# Patient Record
Sex: FEMALE | Race: WHITE | NOT HISPANIC OR LATINO | Employment: OTHER | URBAN - METROPOLITAN AREA
[De-identification: names, ages, dates, MRNs, and addresses within clinical notes are randomized per-mention and may not be internally consistent; named-entity substitution may affect disease eponyms.]

---

## 2023-05-04 LAB
ALANINE AMINOTRANSFERASE (SGPT) (U/L) IN SER/PLAS: 33 U/L (ref 7–45)
ALBUMIN (G/DL) IN SER/PLAS: 4.3 G/DL (ref 3.4–5)
ALBUMIN (MG/L) IN URINE: 7.3 MG/L
ALBUMIN/CREATININE (UG/MG) IN URINE: 6.5 UG/MG CRT (ref 0–30)
ALKALINE PHOSPHATASE (U/L) IN SER/PLAS: 46 U/L (ref 33–136)
ANION GAP IN SER/PLAS: 14 MMOL/L (ref 10–20)
ASPARTATE AMINOTRANSFERASE (SGOT) (U/L) IN SER/PLAS: 13 U/L (ref 9–39)
BILIRUBIN TOTAL (MG/DL) IN SER/PLAS: 0.4 MG/DL (ref 0–1.2)
CALCIDIOL (25 OH VITAMIN D3) (NG/ML) IN SER/PLAS: 38 NG/ML
CALCIUM (MG/DL) IN SER/PLAS: 9.4 MG/DL (ref 8.6–10.3)
CARBON DIOXIDE, TOTAL (MMOL/L) IN SER/PLAS: 29 MMOL/L (ref 21–32)
CHLORIDE (MMOL/L) IN SER/PLAS: 101 MMOL/L (ref 98–107)
CHOLESTEROL (MG/DL) IN SER/PLAS: 143 MG/DL (ref 0–199)
CHOLESTEROL IN HDL (MG/DL) IN SER/PLAS: 47.8 MG/DL
CHOLESTEROL IN LDL (MG/DL) IN SER/PLAS BY DIRECT ASSAY: 74 MG/DL (ref 0–129)
CHOLESTEROL/HDL RATIO: 3
CREATININE (MG/DL) IN SER/PLAS: 0.73 MG/DL (ref 0.5–1.05)
CREATININE (MG/DL) IN URINE: 112 MG/DL (ref 20–320)
ESTIMATED AVERAGE GLUCOSE FOR HBA1C: 146 MG/DL
GFR FEMALE: >90 ML/MIN/1.73M2
GLUCOSE (MG/DL) IN SER/PLAS: 112 MG/DL (ref 74–99)
HEMOGLOBIN A1C/HEMOGLOBIN TOTAL IN BLOOD: 6.7 %
LDL: 61 MG/DL (ref 0–99)
POTASSIUM (MMOL/L) IN SER/PLAS: 4.1 MMOL/L (ref 3.5–5.3)
PROTEIN TOTAL: 7.3 G/DL (ref 6.4–8.2)
SODIUM (MMOL/L) IN SER/PLAS: 140 MMOL/L (ref 136–145)
THYROTROPIN (MIU/L) IN SER/PLAS BY DETECTION LIMIT <= 0.05 MIU/L: 0.89 MIU/L (ref 0.44–3.98)
THYROXINE (T4) FREE (NG/DL) IN SER/PLAS: 1.2 NG/DL (ref 0.61–1.12)
TRIGLYCERIDE (MG/DL) IN SER/PLAS: 171 MG/DL (ref 0–149)
UREA NITROGEN (MG/DL) IN SER/PLAS: 13 MG/DL (ref 6–23)
VLDL: 34 MG/DL (ref 0–40)

## 2023-09-13 LAB
ALANINE AMINOTRANSFERASE (SGPT) (U/L) IN SER/PLAS: 36 U/L (ref 7–45)
ALBUMIN (G/DL) IN SER/PLAS: 4.3 G/DL (ref 3.4–5)
ALKALINE PHOSPHATASE (U/L) IN SER/PLAS: 55 U/L (ref 33–136)
ANION GAP IN SER/PLAS: 13 MMOL/L (ref 10–20)
ASPARTATE AMINOTRANSFERASE (SGOT) (U/L) IN SER/PLAS: 20 U/L (ref 9–39)
BILIRUBIN TOTAL (MG/DL) IN SER/PLAS: 0.4 MG/DL (ref 0–1.2)
CALCIDIOL (25 OH VITAMIN D3) (NG/ML) IN SER/PLAS: 46 NG/ML
CALCIUM (MG/DL) IN SER/PLAS: 9.4 MG/DL (ref 8.6–10.3)
CARBON DIOXIDE, TOTAL (MMOL/L) IN SER/PLAS: 27 MMOL/L (ref 21–32)
CHLORIDE (MMOL/L) IN SER/PLAS: 105 MMOL/L (ref 98–107)
CREATININE (MG/DL) IN SER/PLAS: 0.68 MG/DL (ref 0.5–1.05)
ESTIMATED AVERAGE GLUCOSE FOR HBA1C: 128 MG/DL
GFR FEMALE: >90 ML/MIN/1.73M2
GLUCOSE (MG/DL) IN SER/PLAS: 112 MG/DL (ref 74–99)
HEMOGLOBIN A1C/HEMOGLOBIN TOTAL IN BLOOD: 6.1 %
POTASSIUM (MMOL/L) IN SER/PLAS: 4.2 MMOL/L (ref 3.5–5.3)
PROTEIN TOTAL: 7.3 G/DL (ref 6.4–8.2)
SODIUM (MMOL/L) IN SER/PLAS: 141 MMOL/L (ref 136–145)
THYROTROPIN (MIU/L) IN SER/PLAS BY DETECTION LIMIT <= 0.05 MIU/L: 1.01 MIU/L (ref 0.44–3.98)
THYROXINE (T4) FREE (NG/DL) IN SER/PLAS: 1.06 NG/DL (ref 0.61–1.12)
UREA NITROGEN (MG/DL) IN SER/PLAS: 10 MG/DL (ref 6–23)

## 2023-10-27 ENCOUNTER — HOSPITAL ENCOUNTER (OUTPATIENT)
Dept: RADIOLOGY | Facility: HOSPITAL | Age: 63
Discharge: HOME | End: 2023-10-27
Payer: COMMERCIAL

## 2023-10-27 DIAGNOSIS — F17.200 NICOTINE DEPENDENCE, UNSPECIFIED, UNCOMPLICATED: ICD-10-CM

## 2023-10-27 PROCEDURE — 71271 CT THORAX LUNG CANCER SCR C-: CPT | Performed by: RADIOLOGY

## 2023-10-27 PROCEDURE — 71271 CT THORAX LUNG CANCER SCR C-: CPT

## 2023-12-15 PROBLEM — C50.212 MALIGNANT NEOPLASM OF UPPER-INNER QUADRANT OF LEFT BREAST IN FEMALE, ESTROGEN RECEPTOR POSITIVE (MULTI): Status: ACTIVE | Noted: 2023-12-15

## 2023-12-15 PROBLEM — Z17.0 MALIGNANT NEOPLASM OF UPPER-INNER QUADRANT OF LEFT BREAST IN FEMALE, ESTROGEN RECEPTOR POSITIVE (MULTI): Status: ACTIVE | Noted: 2023-12-15

## 2023-12-19 DIAGNOSIS — G89.29 OTHER CHRONIC PAIN: Primary | ICD-10-CM

## 2023-12-19 RX ORDER — CELECOXIB 200 MG/1
200 CAPSULE ORAL 2 TIMES DAILY
Qty: 60 CAPSULE | Refills: 11 | Status: SHIPPED | OUTPATIENT
Start: 2023-12-19 | End: 2024-03-15 | Stop reason: SDUPTHER

## 2024-01-15 ENCOUNTER — APPOINTMENT (OUTPATIENT)
Dept: RADIOLOGY | Facility: CLINIC | Age: 64
End: 2024-01-15
Payer: COMMERCIAL

## 2024-01-15 ENCOUNTER — APPOINTMENT (OUTPATIENT)
Dept: SURGICAL ONCOLOGY | Facility: CLINIC | Age: 64
End: 2024-01-15
Payer: COMMERCIAL

## 2024-01-17 ENCOUNTER — ANCILLARY PROCEDURE (OUTPATIENT)
Dept: RADIOLOGY | Facility: CLINIC | Age: 64
End: 2024-01-17
Payer: COMMERCIAL

## 2024-01-17 ENCOUNTER — OFFICE VISIT (OUTPATIENT)
Dept: SURGICAL ONCOLOGY | Facility: CLINIC | Age: 64
End: 2024-01-17
Payer: COMMERCIAL

## 2024-01-17 ENCOUNTER — ALLIED HEALTH (OUTPATIENT)
Dept: INTEGRATIVE MEDICINE | Facility: CLINIC | Age: 64
End: 2024-01-17
Payer: COMMERCIAL

## 2024-01-17 VITALS
WEIGHT: 237 LBS | HEART RATE: 97 BPM | BODY MASS INDEX: 40.68 KG/M2 | SYSTOLIC BLOOD PRESSURE: 135 MMHG | DIASTOLIC BLOOD PRESSURE: 89 MMHG

## 2024-01-17 DIAGNOSIS — R92.8 ABNORMAL MAMMOGRAM OF LEFT BREAST: Primary | ICD-10-CM

## 2024-01-17 DIAGNOSIS — R92.8 OTHER ABNORMAL AND INCONCLUSIVE FINDINGS ON DIAGNOSTIC IMAGING OF BREAST: ICD-10-CM

## 2024-01-17 DIAGNOSIS — C50.212 MALIGNANT NEOPLASM OF UPPER-INNER QUADRANT OF LEFT BREAST IN FEMALE, ESTROGEN RECEPTOR POSITIVE (MULTI): Primary | ICD-10-CM

## 2024-01-17 DIAGNOSIS — Z17.0 MALIGNANT NEOPLASM OF UPPER-INNER QUADRANT OF LEFT BREAST IN FEMALE, ESTROGEN RECEPTOR POSITIVE (MULTI): Primary | ICD-10-CM

## 2024-01-17 DIAGNOSIS — C50.212 MALIGNANT NEOPLASM OF UPPER-INNER QUADRANT OF LEFT BREAST IN FEMALE, ESTROGEN RECEPTOR POSITIVE (MULTI): ICD-10-CM

## 2024-01-17 DIAGNOSIS — Z17.0 MALIGNANT NEOPLASM OF UPPER-INNER QUADRANT OF LEFT BREAST IN FEMALE, ESTROGEN RECEPTOR POSITIVE (MULTI): ICD-10-CM

## 2024-01-17 DIAGNOSIS — R92.8 ABNORMAL FINDING ON BREAST IMAGING: ICD-10-CM

## 2024-01-17 DIAGNOSIS — R92.8 ABNORMAL MAMMOGRAM OF LEFT BREAST: ICD-10-CM

## 2024-01-17 PROBLEM — I89.0 LYMPHEDEMA OF UPPER EXTREMITY: Status: ACTIVE | Noted: 2024-01-17

## 2024-01-17 PROBLEM — F17.200 NICOTINE DEPENDENCE: Status: ACTIVE | Noted: 2024-01-17

## 2024-01-17 PROBLEM — K64.4 ANAL SKIN TAG: Status: ACTIVE | Noted: 2024-01-17

## 2024-01-17 PROBLEM — M25.531 RIGHT WRIST PAIN: Status: ACTIVE | Noted: 2024-01-17

## 2024-01-17 PROBLEM — Z85.3 PERSONAL HISTORY OF MALIGNANT NEOPLASM OF BREAST: Status: ACTIVE | Noted: 2024-01-17

## 2024-01-17 PROBLEM — M65.4 DE QUERVAIN'S TENOSYNOVITIS: Status: ACTIVE | Noted: 2024-01-17

## 2024-01-17 PROBLEM — E78.9 LIPID DISORDER: Status: ACTIVE | Noted: 2024-01-17

## 2024-01-17 PROBLEM — E66.9 OBESITY (BMI 30-39.9): Status: ACTIVE | Noted: 2024-01-17

## 2024-01-17 PROBLEM — I10 HYPERTENSION: Status: ACTIVE | Noted: 2024-01-17

## 2024-01-17 PROBLEM — F41.9 ANXIETY: Status: ACTIVE | Noted: 2024-01-17

## 2024-01-17 PROBLEM — K64.9 HEMORRHOID: Status: ACTIVE | Noted: 2024-01-17

## 2024-01-17 PROBLEM — I89.0 LYMPHEDEMA OF BREAST: Status: ACTIVE | Noted: 2024-01-17

## 2024-01-17 PROBLEM — F17.210 SMOKING 1/2 PACK A DAY OR LESS: Status: ACTIVE | Noted: 2024-01-17

## 2024-01-17 PROBLEM — N92.0 MENORRHAGIA: Status: ACTIVE | Noted: 2024-01-17

## 2024-01-17 PROBLEM — G56.00 CARPAL TUNNEL SYNDROME: Status: ACTIVE | Noted: 2024-01-17

## 2024-01-17 PROBLEM — E55.9 VITAMIN D DEFICIENCY: Status: ACTIVE | Noted: 2024-01-17

## 2024-01-17 PROBLEM — J44.9 CHRONIC OBSTRUCTIVE PULMONARY DISEASE (MULTI): Status: ACTIVE | Noted: 2024-01-17

## 2024-01-17 PROBLEM — E03.9 HYPOTHYROIDISM: Status: ACTIVE | Noted: 2024-01-17

## 2024-01-17 PROBLEM — H60.60 CHRONIC OTITIS EXTERNA: Status: ACTIVE | Noted: 2024-01-17

## 2024-01-17 PROBLEM — C50.919 BREAST CA (MULTI): Status: ACTIVE | Noted: 2024-01-17

## 2024-01-17 PROBLEM — R10.12 LEFT UPPER QUADRANT PAIN: Status: ACTIVE | Noted: 2024-01-17

## 2024-01-17 PROBLEM — E78.5 HLD (HYPERLIPIDEMIA): Status: ACTIVE | Noted: 2024-01-17

## 2024-01-17 PROBLEM — R53.83 FATIGUE: Status: ACTIVE | Noted: 2024-01-17

## 2024-01-17 PROCEDURE — 76982 USE 1ST TARGET LESION: CPT | Mod: LT

## 2024-01-17 PROCEDURE — 99214 OFFICE O/P EST MOD 30 MIN: CPT | Performed by: SURGERY

## 2024-01-17 PROCEDURE — 77061 BREAST TOMOSYNTHESIS UNI: CPT | Mod: LT

## 2024-01-17 PROCEDURE — 76642 ULTRASOUND BREAST LIMITED: CPT | Mod: LT

## 2024-01-17 PROCEDURE — 99213 OFFICE O/P EST LOW 20 MIN: CPT | Performed by: HOSPITALIST

## 2024-01-17 PROCEDURE — 3075F SYST BP GE 130 - 139MM HG: CPT | Performed by: SURGERY

## 2024-01-17 PROCEDURE — 3079F DIAST BP 80-89 MM HG: CPT | Performed by: SURGERY

## 2024-01-17 RX ORDER — PREDNISONE 5 MG/1
15 TABLET ORAL DAILY
COMMUNITY
Start: 2024-01-02

## 2024-01-17 RX ORDER — VARENICLINE TARTRATE 1 MG/1
1 TABLET, FILM COATED ORAL 2 TIMES DAILY
COMMUNITY
Start: 2023-11-12 | End: 2024-03-07

## 2024-01-17 RX ORDER — PREDNISONE 10 MG/1
TABLET ORAL
COMMUNITY
End: 2024-01-17 | Stop reason: WASHOUT

## 2024-01-17 RX ORDER — LOSARTAN POTASSIUM 100 MG/1
100 TABLET ORAL DAILY
COMMUNITY
Start: 2024-01-02

## 2024-01-17 RX ORDER — ROSUVASTATIN CALCIUM 5 MG/1
5 TABLET, COATED ORAL DAILY
COMMUNITY

## 2024-01-17 RX ORDER — ALBUTEROL SULFATE 90 UG/1
2 AEROSOL, METERED RESPIRATORY (INHALATION) EVERY 6 HOURS PRN
COMMUNITY

## 2024-01-17 RX ORDER — ASPIRIN 325 MG
TABLET, DELAYED RELEASE (ENTERIC COATED) ORAL
COMMUNITY
Start: 2018-07-13 | End: 2024-03-07 | Stop reason: ALTCHOICE

## 2024-01-17 RX ORDER — AMLODIPINE BESYLATE 5 MG/1
TABLET ORAL
COMMUNITY

## 2024-01-17 RX ORDER — ANASTROZOLE 1 MG/1
TABLET ORAL
COMMUNITY
End: 2024-03-07 | Stop reason: SINTOL

## 2024-01-17 RX ORDER — EXEMESTANE 25 MG/1
25 TABLET ORAL DAILY
COMMUNITY

## 2024-01-17 RX ORDER — ATENOLOL 25 MG/1
TABLET ORAL
COMMUNITY
Start: 2021-05-27

## 2024-01-17 RX ORDER — LEVOTHYROXINE SODIUM 200 UG/1
200 TABLET ORAL
COMMUNITY

## 2024-01-17 RX ORDER — UBIDECARENONE 30 MG
CAPSULE ORAL
COMMUNITY

## 2024-01-17 RX ORDER — EZETIMIBE 10 MG/1
10 TABLET ORAL DAILY
COMMUNITY
Start: 2024-01-02 | End: 2024-03-07 | Stop reason: ALTCHOICE

## 2024-01-17 RX ORDER — EZETIMIBE/ATORVASTATIN CALCIUM 10 MG-10MG
TABLET ORAL DAILY
COMMUNITY

## 2024-01-17 ASSESSMENT — PAIN SCALES - GENERAL
PAINLEVEL_OUTOF10: 5 - MODERATE PAIN
PAINLEVEL: 0-NO PAIN

## 2024-01-17 NOTE — PROGRESS NOTES
Chief Complaint     FU  left breast cancer  left breast post treatment fibrosis.      History of Present IllnessReferred by carie barreto     63-year-old not Ashkenazi Caodaism,  female here for FU  left breast cancer  she is here alone     History:  1) bilateral excision of excess axillary tissue  2) 4/22/2021 BL screening. het dense. right negative, left asymmetry; BIRADS 0  3) 5/19/2021 left breast diagnostic imaging. Left breast architectural distortion persists. Targeted ultrasound. At 3:00 6 cm from the nipple a 1.4 x 1.1 cm irregular mass is noted, BI-RADS 5. This corresponds to the mammographic finding. Left axillary ultrasound is negative.  4) 5/27/2021 left breast ultrasound-guided core biopsy with clip. Final pathology reveals grade 1 invasive ductal carcinoma. ER greater than 95%, NM 20. HER-2 negative. Low risk MammaPrint. open coil hydro-Dmitry in good position.  5) 624, 2021. Left partial mastectomy, left sentinel lymph node biopsy. Final pathology reveals 1.8 cm grade 1 invasive ductal carcinoma. Margins negative. 2 - lymph nodes.  6) completed radiation. complicated by pericarditis   7) Arimidex--> exemestane (moftakhar).  8) genetic testing with VUS in BRCA2  9) 516, 2022. Heterogeneously dense. BI-RADS 2.  10) 5/30/2023. BL MG. left BIRADS 3  11) 6/16/2023 BL breast MRI. right neg. left NME BIRADS 4  12) 6/29/2023 left US BIRADS 4  13) 6/29/2023 2 site bx both sites show fat necrosis and inflammation. concordant.   14) January 17, 2024.  Left breast follow-up imaging.  Left BI-RADS 4.     She presents today for routine FU.      Ob/gyn history  menarche 17  menopause 56  G 2 P 2, age of first delivery 20  15 years OCPs, no fertility treatments, no HRT     No family history of breast or ovarian cancer.  Mother with colon cancer.        Review of Systems  A comprehensive ROS was taken on the patient intake form and reviewed with the patient. This form is scanned into the electronic medical  record.     Constitutional symptoms: Denies generalized fatigue. Denies weight change, fevers/chills, difficulty sleeping   Eyes: Denies double vision, glaucoma, cataracts.  Ear/nose/throat/mouth: + hearing changes, sore throat, sinus problems.  Cardiovascular: No chest pain. Denies irregular heartbeat. Denies ankle swelling.  Respiratory: + wheezing, cough, or shortness of breath.  Gastrointestinal: No abdominal pain, No nausea/vomiting. No indigestion/heartburn. No change in bowel habits. + constipation or diarrhea.   Genitourinary: No urinary incontinence. + urinary frequency. No painful urination.  Musculoskeletal: No bone pain, no muscle pain, + joint pain.   Integumentary: No rash. No masses. No changes in moles. No easy bruising.  Neurological: No headaches. No tremors. + numbness/tingling.  Psychiatric: No anxiety. No depression.  Endocrine: No excessive thirst. Not too hot or too cold. + tired or fatigued.   Hematological/lymphatic: No swollen glands or blood clotting problems. No bruising.      Physical Exam  A chaperone was offered for all portions of the physical exam. The patient declined.      General appearance: appears stated age, alert and oriented x 3  Head: Normocephalic, atraumatic  Eyes: conjunctivae/corneas clear.  Ears: External ears are normal, hearing is grossly intact.  Lungs: normal breathing  Heart: regular   Abdomen: Soft, nontender, nondistended.  Neurologic: grossly normal  Lymph nodes: No cervical, supraclavicular or axillary lymphadenopathy bilaterally  Bilateral low axillary incisions are healed.     Breast: A comprehensive breast exam was performed in the seated and supine positions. left breast is contracted and significantly smaller than the right. Bilateral nipples are everted. There are no skin changes on arm maneuvers. Bra size: D            Right: no masses            Left: healed incision. significant treatment changes with firmness in the central breast.      Provider  Impressions     1) 63-year-old not Ashkenazi Holiness,  female here with new left breast cancer cT1 cN0 grade 1 IDC, ER > 95% [place] 20% Her2 negative. mammaprint low. now s/p left PM/SLNB pT1c pN0 margins neg. completed XRT. arimidex--> exemestane-->not tolerating. left breast radiation fibrosis.  Indeterminate finding left breast  2) co morbidities include anxiety, COPD, HTN. current tobacco use.   3) No FH of breast or ovarian cancer     Patient Discussion/Summary     She is here alone today.  She continues on Arimidex.  She was recently started on steroids for PMR and feels much better.  Clinical exam today is stable.  Follow-up left breast imaging was performed today.  An indeterminate left breast mass was noted for which biopsy is recommended.  We discussed the biopsy procedure. The radiologist in the breast center will be performing the biopsy. We discussed the risks and benefits of the biopsy procedure.  Risks include but are not limited to bleeding, infection, injury to nearby structures and poor wound healing. A small titanium clip will be placed to junior the biopsy site.  A soft mammogram will be performed after the biopsy.  She wishes to proceed.  We will call her when biopsy results are available; typically in 3 business days after the biopsy is performed.  We briefly reviewed the types of pathology results the biopsy could reveal; benign, atypical, discordant, malignant.  Further management will be reviewed at a follow up appointment. She knows to call sooner if she has any questions or concerns.  All questions answered.

## 2024-01-17 NOTE — PROGRESS NOTES
Massage Therapy Visit:     Luma Schwab was referred by Dr. Simpson.    Condition of Client Subjective :  Patient ID: Luma Schwab is a 63 y.o. female who presents for reason for a 50 minute restorative and Ren Lymph Drainage (MLD) massage.  Patient had treatment on the left breast.  Patient was treated for L breast CA.  I did MLD on the left arm.  I explained that this is very light work.  I did a gentle massage the the rest of the body.  Patient stated that she had been dealing with whole body muscle discomfort a few weeks ago.  She stated the she was on a steroid and it helped with her muscle discomfort.  Patient was dealing with stress today.  She had a mammogram at [facility].  She was told she needs a biopsy.  Patient is also stressed because she was laid off from a job that she had worked at for 40 years.  Patient was able to relax during the massage.  She did notice an improve with her muscle discomfort.  Patient stated that she has had lymphatic massage and it was helpful.               Objective   Pre-treatment Assessment  Arrival Mode: Ambulatory  Pain Score: 5 - Moderate pain  Anxiety Level (0-10): 9  Stress Level (0-10): 8  Coping Level (0-10): 10  Depression Level (0-10): 5  Fatigue Level (0-10): 5  Nausea Level (0-10): 1  Wellbeing Level (0-10): 0        Actions Assessment/Plan :  Provider reviewed plan for the massage session, precautions and contraindications. Patient/guardian/hospital staff has given consent to treat with full understanding of what to expect during the session. Before massage therapy began, provider explained to the patient to communicate at any time if the pressure was causing discomfort past their tolerance level. Patient agreed to advise therapist.    Massage Treatment  Patient Position: Table  Positioning Assistance: Did not need assistance  Massage Technique: Lymphatic drainiage, Relaxation massage  Pressure Scale: 1 - Light pressure, 2 - Mild  pressure    Response:  Post-treatment Assessment  Patient Fell Asleep During Treatment: No  Patient Noted Improvement of the Following Symptoms: Muscle tension, ROM  Pain Score: 5 - Moderate pain  Anxiety Level (0-10): 5  Stress Level (0-10): 0  Coping Level (0-10): 10  Depression Level (0-10): 0  Fatigue Level (0-10): 5  Nausea Level (0-10): 0  Wellbeing Level (0-10): 0    Evaluation:    Patient was able to relax during the massage.  She did notice an improvement with her muscle discomfort.  Patient will follow with treatment.

## 2024-01-17 NOTE — PROGRESS NOTES
"Patient ID: Luma Schwab is a 63 y.o. female.  Referring Physician: No referring provider defined for this encounter.  Primary Care Provider: Andrez Roe    CANCER HISTORY:   63 yo woman with stage Ia (pT1cN0) L breast IDC, ER/NV pos and HER2 neg, low risk Mammaprint, S/p partial   mastectomy with SLNbx, radiation and has been on endocrine therapy since   September 2021. Changed to exemstane 4/1/22 due to arthralgias.      Had MRI and has some f/u with US - biopsies neg  Had mammogram and biopsy today        History of Present IllnessArthralgias - \"I feel like a cripple\" - takes 10 min to walk through the house  Ankles, feet, lower back since started AI worsened  1/10/23 Bone scan - no bone mets, DJD, trochanteric bursitis.  On crestor as well  UPDATE: taking turmeric now  Started acupuncture - helping a lot with lower back, hands as well, hip. Still having R wrist pain and diff holding things  Significantly improved in LBP     6/10/23 went to a wedding - held AI x 2 wks to get through, now back on it, already feeling joint pains     Fatigue - never sleeping enough - improved now, but chronically tired  UPDATE: CoQ10, drinking ginseng tea  Consider host defense stamets 7 - taking     going through lymphatic massage therapy - helping on L side     Diet: \"ok\", borderline dm, wants to lose wt  Trying to lose wt. High TG.  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  There were no vitals taken for this visit.    PHYSICAL EXAM:  NAD, awake/alert  HEENT, NCAT, OP clear, no oral lesions  CTA bilat  RRR no mgr  Abd soft/nt/nd+bs  No c/c/e/ttp  Motor/sensory intact, CN 2-12 intact     RESULTS:  Lab Results   Component Value Date    WBC 8.5 07/22/2022    HGB 14.8 07/22/2022    HCT 46.0 07/22/2022     07/22/2022    CREATININE 0.68 09/13/2023    AST 20 09/13/2023       Assessment/Plan   Cancer Staging   No matching staging information was found for the patient.    CANCER SPECIFIC RECCS:  63 yo woman with stage Ia (pT1cN0) L breast IDC, ER/CO pos and HER2 neg, low risk Mammaprint, S/p partial   mastectomy with SLNbx, radiation and has been on endocrine therapy since   September 2021. Changed to exemestane 4/1/22 due to arthralgias.      Arthralgias:  Acupuncture - using and helping, but not now  Arnica tablets or cream (Boiron)  Vitamin D3 6843-7908 IU/day  [place] 3 - [place] [employer] - taking  Deep Blue (DoTerra) or PROZE NERVE  Turmeric (Chandrika Turmeric Supreme) - taking  Celebrex - helping a ton  Yoga- consider chair yoga     Diet - 5-9 fruits/veg/day  Cruciferous Vegetables - Brussel Sprouts, Kale, Broccoli, Cauliflower  Organic dairy  Plant based anti-inflammatory whole foods diet  AICR New American Plate  Fatigue - Would w/u ISAIAS, getting sleep test, needs sleep study - can discuss with PMD  On thyroid med  Taking COQ10  Consider American Ginseng 2 g/day - taking  Waiting for a sleep apnea machine for ISAIAS     Breast cancer- would try HOST DEFENSE STAMETS 7 - taking  Anticancer Lifestyle Program     Frustration/anxiety - managing, somewhat weather related  energy level  ok, but still tired  Consider lymphatic massage     F/u 3 mo to f/u on Anticancer Lifestyle Program  Symptom Management Clinic - will try acupuncture      SYMPTOM MANAGEMENT:  Integrative Oncology Symptom Management:    The [facility] Integrative Oncology Symptom Management clinic offers multi-disciplinary supervised care of cancer patients using Integrative Modalities billed to insurance using NCCN and SIO/ASCO guideline-driven practices.  ESAS is obtained prior to and after each treatment by the practitioner    Symptoms Managed:  Arthralgias, myalgias, improved with steroids  Numbness in feet also resolved with steroids    Anxiety - had a mammogram today with a biopsy, causing stress and anxiety    Natural Products utilized:  Tart cherry - bid  MVI  Vit D3 50 mcg  Claritin  Apple cider vinegar gummies  Turmeric  American Ginseng  Fish Oil  Host Defense Stamets 7    Integrative Treatment: Massage  Session #: 1  Frequency: weekly    Referrals:   Recommendations:    Follow Up:  Symptom Management: weekly  Integrative Oncology:     I have personally seen the patient and supervised the treatment by the integrative practitioner during this visit.  Pt had symptoms discussed and I was present for the patient's 60 minutes of direct patient care.     Neto Simposn MD

## 2024-01-22 ENCOUNTER — HOSPITAL ENCOUNTER (OUTPATIENT)
Dept: RADIOLOGY | Facility: HOSPITAL | Age: 64
Discharge: HOME | End: 2024-01-22
Payer: COMMERCIAL

## 2024-01-22 DIAGNOSIS — R92.8 ABNORMAL FINDING ON BREAST IMAGING: ICD-10-CM

## 2024-01-22 DIAGNOSIS — R92.8 ABNORMAL MAMMOGRAM OF LEFT BREAST: ICD-10-CM

## 2024-01-22 PROCEDURE — 19083 BX BREAST 1ST LESION US IMAG: CPT | Mod: LT

## 2024-01-22 PROCEDURE — 2720000007 HC OR 272 NO HCPCS

## 2024-01-22 PROCEDURE — 88305 TISSUE EXAM BY PATHOLOGIST: CPT | Performed by: PATHOLOGY

## 2024-01-22 PROCEDURE — 88305 TISSUE EXAM BY PATHOLOGIST: CPT | Mod: TC,SUR,STJLAB | Performed by: SURGERY

## 2024-01-22 PROCEDURE — 77065 DX MAMMO INCL CAD UNI: CPT | Mod: LT

## 2024-01-22 PROCEDURE — 19083 BX BREAST 1ST LESION US IMAG: CPT | Mod: LEFT SIDE | Performed by: RADIOLOGY

## 2024-01-22 PROCEDURE — 2780000003 HC OR 278 NO HCPCS

## 2024-01-22 PROCEDURE — A4648 IMPLANTABLE TISSUE MARKER: HCPCS

## 2024-01-22 PROCEDURE — 77065 DX MAMMO INCL CAD UNI: CPT | Mod: LEFT SIDE | Performed by: RADIOLOGY

## 2024-01-22 PROCEDURE — 2500000005 HC RX 250 GENERAL PHARMACY W/O HCPCS: Performed by: RADIOLOGY

## 2024-01-22 RX ADMIN — Medication 10 ML: at 14:49

## 2024-01-24 LAB
LABORATORY COMMENT REPORT: NORMAL
PATH REPORT.FINAL DX SPEC: NORMAL
PATH REPORT.GROSS SPEC: NORMAL
PATH REPORT.RELEVANT HX SPEC: NORMAL
PATH REPORT.TOTAL CANCER: NORMAL

## 2024-01-25 ENCOUNTER — TELEPHONE (OUTPATIENT)
Dept: SURGICAL ONCOLOGY | Facility: CLINIC | Age: 64
End: 2024-01-25
Payer: COMMERCIAL

## 2024-01-25 DIAGNOSIS — R92.8 ABNORMAL FINDING ON BREAST IMAGING: ICD-10-CM

## 2024-01-25 NOTE — TELEPHONE ENCOUNTER
Spoke with Ms. Schwab regarding breast biopsy results. She will follow up in 6 mos as recommended by radiologist.

## 2024-02-07 ENCOUNTER — APPOINTMENT (OUTPATIENT)
Dept: INTEGRATIVE MEDICINE | Facility: CLINIC | Age: 64
End: 2024-02-07
Payer: COMMERCIAL

## 2024-02-14 ENCOUNTER — ALLIED HEALTH (OUTPATIENT)
Dept: INTEGRATIVE MEDICINE | Facility: CLINIC | Age: 64
End: 2024-02-14
Payer: COMMERCIAL

## 2024-02-14 DIAGNOSIS — Z17.0 MALIGNANT NEOPLASM OF UPPER-INNER QUADRANT OF LEFT BREAST IN FEMALE, ESTROGEN RECEPTOR POSITIVE (MULTI): Primary | ICD-10-CM

## 2024-02-14 DIAGNOSIS — C50.212 MALIGNANT NEOPLASM OF UPPER-INNER QUADRANT OF LEFT BREAST IN FEMALE, ESTROGEN RECEPTOR POSITIVE (MULTI): Primary | ICD-10-CM

## 2024-02-14 PROCEDURE — 99213 OFFICE O/P EST LOW 20 MIN: CPT | Performed by: HOSPITALIST

## 2024-02-14 ASSESSMENT — PAIN SCALES - GENERAL: PAINLEVEL_OUTOF10: 2

## 2024-02-14 NOTE — PROGRESS NOTES
Massage Therapy Visit:     Luma Schwab was .    Condition of Client Subjective :  Patient ID: Luma Schwab is a 64 y.o. female who presents for a 30 minute restorative massage.  Patient is being treated for breast cancer. Patient stated that she had been sick last week.  She had a slight cough and sinus drainage.  I gave her self care tips.  Patient stated that she had been babysitting 3 young boys over the weekend.  She thinks that could have contributed to her muscle discomfort.  Patient was able to relax.  She noticed an improvement with her muscle issues.     Session Information  Visit Type: Follow-up visit  Description of present complaint: Muscle tension        Objective   Pre-treatment Assessment  Arrival Mode: Ambulatory  Pain Score: 7  Anxiety Level (0-10): 5  Stress Level (0-10): 6  Coping Level (0-10): 8  Depression Level (0-10): 7  Fatigue Level (0-10): 7  Nausea Level (0-10): 6  Wellbeing Level (0-10): 3      Actions Assessment/Plan :  Provider reviewed plan for the massage session, precautions and contraindications. Patient/guardian/hospital staff has given consent to treat with full understanding of what to expect during the session. Before massage therapy began, provider explained to the patient to communicate at any time if the pressure was causing discomfort past their tolerance level. Patient agreed to advise therapist.    Massage Treatment  Patient Position: Table  Positioning Assistance: Did not need assistance  Massage Technique: Relaxation massage  Pressure Scale: 2 - Mild pressure    Response:  Post-treatment Assessment  Pain Score: 2  Anxiety Level (0-10): 2  Stress Level (0-10): 1  Coping Level (0-10): 8  Depression Level (0-10): 2  Fatigue Level (0-10): 5  Nausea Level (0-10): 0  Wellbeing Level (0-10): 2    Evaluation:    Patient will follow up as needed.

## 2024-02-14 NOTE — PROGRESS NOTES
"Patient ID: uLma Schwab is a 64 y.o. female.  Referring Physician: No referring provider defined for this encounter.  Primary Care Provider: Andrez Roe    CANCER HISTORY:   63 yo woman with stage Ia (pT1cN0) L breast IDC, ER/DC pos and HER2 neg, low risk Mammaprint, S/p partial   mastectomy with SLNbx, radiation and has been on endocrine therapy since   September 2021. Changed to exemstane 4/1/22 due to arthralgias.      Had MRI and has some f/u with US - biopsies neg  Had mammogram and biopsy today        History of Present IllnessArthralgias - \"I feel like a cripple\" - takes 10 min to walk through the house  Ankles, feet, lower back since started AI worsened  1/10/23 Bone scan - no bone mets, DJD, trochanteric bursitis.  On crestor as well  UPDATE: taking turmeric now  Started acupuncture - helping a lot with lower back, hands as well, hip. Still having R wrist pain and diff holding things  Significantly improved in LBP     6/10/23 went to a wedding - held AI x 2 wks to get through, now back on it, already feeling joint pains     Fatigue - never sleeping enough - improved now, but chronically tired  UPDATE: CoQ10, drinking ginseng tea  Consider host defense stamets 7 - taking     going through lymphatic massage therapy - helping on L side     Diet: \"ok\", borderline dm, wants to lose wt  Trying to lose wt. High TG.  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  There were no vitals taken for this visit.    PHYSICAL EXAM:  NAD, awake/alert  HEENT, NCAT, OP clear, no oral lesions  CTA bilat  RRR no mgr  Abd soft/nt/nd+bs  No c/c/e/ttp  Motor/sensory intact, CN 2-12 intact     RESULTS:  Lab Results   Component Value Date    WBC 8.5 07/22/2022    HGB 14.8 07/22/2022    HCT 46.0 07/22/2022     07/22/2022    CREATININE 0.68 09/13/2023    AST 20 09/13/2023       Assessment/Plan   Cancer Staging   No matching staging information was found for the patient.    CANCER SPECIFIC RECCS:  61 yo woman with stage Ia (pT1cN0) L breast IDC, ER/MT pos and HER2 neg, low risk Mammaprint, S/p partial   mastectomy with SLNbx, radiation and has been on endocrine therapy since   September 2021. Changed to exemestane 4/1/22 due to arthralgias.      Arthralgias:  Acupuncture - using and helping, but not now  Arnica tablets or cream (Boiron)  Vitamin D3 5919-5238 IU/day  [place] 3 - [place] [employer] - taking  Deep Blue (DoTerra) or PROZE NERVE  Turmeric (Chandrika Turmeric Supreme) - taking  Celebrex - helping a ton  Yoga- consider chair yoga     Diet - 5-9 fruits/veg/day  Cruciferous Vegetables - Brussel Sprouts, Kale, Broccoli, Cauliflower  Organic dairy  Plant based anti-inflammatory whole foods diet  AICR New American Plate  Fatigue - Would w/u ISAIAS, getting sleep test, needs sleep study - can discuss with PMD  On thyroid med  Taking COQ10  Consider American Ginseng 2 g/day - taking  Waiting for a sleep apnea machine for ISAIAS     Breast cancer- would try HOST DEFENSE STAMETS 7 - taking  Anticancer Lifestyle Program     Frustration/anxiety - managing, somewhat  weather related  energy level ok, but still tired  Consider lymphatic massage     F/u 3 mo to f/u on Anticancer Lifestyle Program  Symptom Management Clinic - will try acupuncture        SYMPTOM MANAGEMENT:  Integrative Oncology Symptom Management:     [facility] Integrative Oncology Symptom Management clinic offers multi-disciplinary supervised care of cancer patients using Integrative Modalities billed to insurance using NCCN and SIO/ASCO guideline-driven practices.  ESAS is obtained prior to and after each treatment by the practitioner     Symptoms Managed:  Arthralgias, myalgias, improved with steroids, tapering  Numbness in feet also resolved with steroids     Anxiety - good now     Natural Products utilized:  Tart cherry - bid  MVI  Vit D3 50 mcg  Claritin  Apple cider vinegar gummies  Turmeric  American Ginseng  Fish Oil  Host Defense Stamets 7     Integrative Treatment: Massage  Session #: 2  Frequency: weekly     Referrals:   Recommendations: will exercise and focus on diet more     Follow Up:  Symptom Management: weekly  Integrative Oncology:      I have personally seen the patient and supervised the treatment by the integrative practitioner during this visit.  Neto Simpson MD

## 2024-02-21 ENCOUNTER — ALLIED HEALTH (OUTPATIENT)
Dept: INTEGRATIVE MEDICINE | Facility: CLINIC | Age: 64
End: 2024-02-21
Payer: COMMERCIAL

## 2024-02-21 DIAGNOSIS — C50.212 MALIGNANT NEOPLASM OF UPPER-INNER QUADRANT OF LEFT BREAST IN FEMALE, ESTROGEN RECEPTOR POSITIVE (MULTI): Primary | ICD-10-CM

## 2024-02-21 DIAGNOSIS — Z17.0 MALIGNANT NEOPLASM OF UPPER-INNER QUADRANT OF LEFT BREAST IN FEMALE, ESTROGEN RECEPTOR POSITIVE (MULTI): Primary | ICD-10-CM

## 2024-02-21 NOTE — PROGRESS NOTES
"Patient ID: Luma Schwab is a 64 y.o. female.  Referring Physician: No referring provider defined for this encounter.  Primary Care Provider: Andrez Roe    CANCER HISTORY:   61 yo woman with stage Ia (pT1cN0) L breast IDC, ER/WV pos and HER2 neg, low risk Mammaprint, S/p partial   mastectomy with SLNbx, radiation and has been on endocrine therapy since   September 2021. Changed to exemstane 4/1/22 due to arthralgias.      Had MRI and has some f/u with US - biopsies neg  Had mammogram and biopsy today        History of Present IllnessArthralgias - \"I feel like a cripple\" - takes 10 min to walk through the house  Ankles, feet, lower back since started AI worsened  1/10/23 Bone scan - no bone mets, DJD, trochanteric bursitis.  On crestor as well  UPDATE: taking turmeric now  Started acupuncture - helping a lot with lower back, hands as well, hip. Still having R wrist pain and diff holding things  Significantly improved in LBP     6/10/23 went to a wedding - held AI x 2 wks to get through, now back on it, already feeling joint pains     Fatigue - never sleeping enough - improved now, but chronically tired  UPDATE: CoQ10, drinking ginseng tea  Consider host defense stamets 7 - taking     going through lymphatic massage therapy - helping on L side     Diet: \"ok\", borderline dm, wants to lose wt  Trying to lose wt. High TG.  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  There were no vitals taken for this visit.    PHYSICAL EXAM:  NAD, awake/alert  HEENT, NCAT, OP clear, no oral lesions  CTA bilat  RRR no mgr  Abd soft/nt/nd+bs  No c/c/e/ttp  Motor/sensory intact, CN 2-12 intact     RESULTS:  Lab Results   Component Value Date    WBC 8.5 07/22/2022    HGB 14.8 07/22/2022    HCT 46.0 07/22/2022     07/22/2022    CREATININE 0.68 09/13/2023    AST 20 09/13/2023       Assessment/Plan   Cancer Staging   No matching staging information was found for the patient.    CANCER SPECIFIC RECCS:  61 yo woman with stage Ia (pT1cN0) L breast IDC, ER/NM pos and HER2 neg, low risk Mammaprint, S/p partial   mastectomy with SLNbx, radiation and has been on endocrine therapy since   September 2021. Changed to exemestane 4/1/22 due to arthralgias.      Arthralgias:  Acupuncture - using and helping, but not now  Arnica tablets or cream (Boiron)  Vitamin D3 2933-3194 IU/day  [place] 3 - Snead Naturals - taking  Deep Blue (DoTerra) or PROZE NERVE  Turmeric (Chandrika Turmeric Supreme) - taking  Celebrex - helping a ton  Yoga- consider chair yoga     Diet - 5-9 fruits/veg/day  Cruciferous Vegetables - Brussel Sprouts, Kale, Broccoli, Cauliflower  Organic dairy  Plant based anti-inflammatory whole foods diet  AICR New American Plate  Fatigue - Would w/u ISAIAS, getting sleep test, needs sleep study - can discuss with PMD  On thyroid med  Taking COQ10  Consider American Ginseng 2 g/day - taking  Waiting for a sleep apnea machine for ISAIAS     Breast cancer- would try HOST DEFENSE STAMETS 7 - taking  Anticancer Lifestyle Program     Frustration/anxiety - managing, somewhat  weather related  energy level ok, but still tired  Consider lymphatic massage     F/u 3 mo to f/u on Anticancer Lifestyle Program  Symptom Management Clinic - will try acupuncture        SYMPTOM MANAGEMENT:  Integrative Oncology Symptom Management:     The [facility] Integrative Oncology Symptom Management clinic offers multi-disciplinary supervised care of cancer patients using Integrative Modalities billed to insurance using NCCN and SIO/ASCO guideline-driven practices.  ESAS is obtained prior to and after each treatment by the practitioner     Symptoms Managed:  Arthralgias, myalgias, improved with steroids, tapering  Numbness in feet also resolved with steroids     Anxiety - good now     Natural Products utilized:  Tart cherry - bid  MVI  Vit D3 50 mcg  Claritin  Apple cider vinegar gummies  Turmeric  American Ginseng  Fish Oil  Host Defense Stamets 7     Integrative Treatment: Massage  Session #: 3  Frequency: weekly     Referrals:   Recommendations: will exercise and focus on diet more     Follow Up:  Symptom Management: weekly  Integrative Oncology:      I have personally seen the patient and supervised the treatment by the integrative practitioner during this visit.  Neto Simpson MD

## 2024-03-06 RX ORDER — HEPARIN 100 UNIT/ML
500 SYRINGE INTRAVENOUS AS NEEDED
Status: CANCELLED | OUTPATIENT
Start: 2024-03-07

## 2024-03-06 RX ORDER — HEPARIN SODIUM,PORCINE/PF 10 UNIT/ML
50 SYRINGE (ML) INTRAVENOUS AS NEEDED
Status: CANCELLED | OUTPATIENT
Start: 2024-03-07

## 2024-03-07 ENCOUNTER — OFFICE VISIT (OUTPATIENT)
Dept: HEMATOLOGY/ONCOLOGY | Facility: CLINIC | Age: 64
End: 2024-03-07
Payer: COMMERCIAL

## 2024-03-07 ENCOUNTER — INFUSION (OUTPATIENT)
Dept: HEMATOLOGY/ONCOLOGY | Facility: CLINIC | Age: 64
End: 2024-03-07
Payer: COMMERCIAL

## 2024-03-07 ENCOUNTER — LAB (OUTPATIENT)
Dept: LAB | Facility: CLINIC | Age: 64
End: 2024-03-07
Payer: COMMERCIAL

## 2024-03-07 VITALS
SYSTOLIC BLOOD PRESSURE: 154 MMHG | HEART RATE: 98 BPM | OXYGEN SATURATION: 92 % | BODY MASS INDEX: 40.87 KG/M2 | TEMPERATURE: 97.9 F | DIASTOLIC BLOOD PRESSURE: 93 MMHG | RESPIRATION RATE: 22 BRPM | WEIGHT: 238.1 LBS

## 2024-03-07 DIAGNOSIS — Z17.0 MALIGNANT NEOPLASM OF UPPER-INNER QUADRANT OF LEFT BREAST IN FEMALE, ESTROGEN RECEPTOR POSITIVE (MULTI): ICD-10-CM

## 2024-03-07 DIAGNOSIS — C50.212 MALIGNANT NEOPLASM OF UPPER-INNER QUADRANT OF LEFT BREAST IN FEMALE, ESTROGEN RECEPTOR POSITIVE (MULTI): ICD-10-CM

## 2024-03-07 DIAGNOSIS — Z17.0 MALIGNANT NEOPLASM OF UPPER-INNER QUADRANT OF LEFT BREAST IN FEMALE, ESTROGEN RECEPTOR POSITIVE (MULTI): Primary | ICD-10-CM

## 2024-03-07 DIAGNOSIS — C50.212 MALIGNANT NEOPLASM OF UPPER-INNER QUADRANT OF LEFT BREAST IN FEMALE, ESTROGEN RECEPTOR POSITIVE (MULTI): Primary | ICD-10-CM

## 2024-03-07 LAB
ALBUMIN SERPL BCP-MCNC: 4.5 G/DL (ref 3.4–5)
ALP SERPL-CCNC: 48 U/L (ref 33–136)
ALT SERPL W P-5'-P-CCNC: 28 U/L (ref 7–45)
ANION GAP SERPL CALC-SCNC: 13 MMOL/L (ref 10–20)
AST SERPL W P-5'-P-CCNC: 16 U/L (ref 9–39)
BASOPHILS # BLD AUTO: 0.03 X10*3/UL (ref 0–0.1)
BASOPHILS NFR BLD AUTO: 0.2 %
BILIRUB SERPL-MCNC: 0.3 MG/DL (ref 0–1.2)
BUN SERPL-MCNC: 11 MG/DL (ref 6–23)
CALCIUM SERPL-MCNC: 9.4 MG/DL (ref 8.6–10.3)
CHLORIDE SERPL-SCNC: 103 MMOL/L (ref 98–107)
CO2 SERPL-SCNC: 27 MMOL/L (ref 21–32)
CREAT SERPL-MCNC: 0.72 MG/DL (ref 0.5–1.05)
EGFRCR SERPLBLD CKD-EPI 2021: >90 ML/MIN/1.73M*2
EOSINOPHIL # BLD AUTO: 0.01 X10*3/UL (ref 0–0.7)
EOSINOPHIL NFR BLD AUTO: 0.1 %
ERYTHROCYTE [DISTWIDTH] IN BLOOD BY AUTOMATED COUNT: 12.6 % (ref 11.5–14.5)
GLUCOSE SERPL-MCNC: 128 MG/DL (ref 74–99)
HCT VFR BLD AUTO: 46 % (ref 36–46)
HGB BLD-MCNC: 15.2 G/DL (ref 12–16)
IMM GRANULOCYTES # BLD AUTO: 0.09 X10*3/UL (ref 0–0.7)
IMM GRANULOCYTES NFR BLD AUTO: 0.7 % (ref 0–0.9)
LYMPHOCYTES # BLD AUTO: 1.52 X10*3/UL (ref 1.2–4.8)
LYMPHOCYTES NFR BLD AUTO: 11.2 %
MAGNESIUM SERPL-MCNC: 1.93 MG/DL (ref 1.6–2.4)
MCH RBC QN AUTO: 32.1 PG (ref 26–34)
MCHC RBC AUTO-ENTMCNC: 33 G/DL (ref 32–36)
MCV RBC AUTO: 97 FL (ref 80–100)
MONOCYTES # BLD AUTO: 0.76 X10*3/UL (ref 0.1–1)
MONOCYTES NFR BLD AUTO: 5.6 %
NEUTROPHILS # BLD AUTO: 11.22 X10*3/UL (ref 1.2–7.7)
NEUTROPHILS NFR BLD AUTO: 82.2 %
NRBC BLD-RTO: 0 /100 WBCS (ref 0–0)
PHOSPHATE SERPL-MCNC: 3.1 MG/DL (ref 2.5–4.9)
PLATELET # BLD AUTO: 198 X10*3/UL (ref 150–450)
POTASSIUM SERPL-SCNC: 4.2 MMOL/L (ref 3.5–5.3)
PROT SERPL-MCNC: 7.6 G/DL (ref 6.4–8.2)
RBC # BLD AUTO: 4.74 X10*6/UL (ref 4–5.2)
SODIUM SERPL-SCNC: 139 MMOL/L (ref 136–145)
WBC # BLD AUTO: 13.6 X10*3/UL (ref 4.4–11.3)

## 2024-03-07 PROCEDURE — 85025 COMPLETE CBC W/AUTO DIFF WBC: CPT

## 2024-03-07 PROCEDURE — 83735 ASSAY OF MAGNESIUM: CPT

## 2024-03-07 PROCEDURE — 84100 ASSAY OF PHOSPHORUS: CPT

## 2024-03-07 PROCEDURE — 99214 OFFICE O/P EST MOD 30 MIN: CPT | Performed by: INTERNAL MEDICINE

## 2024-03-07 PROCEDURE — 2500000004 HC RX 250 GENERAL PHARMACY W/ HCPCS (ALT 636 FOR OP/ED): Performed by: INTERNAL MEDICINE

## 2024-03-07 PROCEDURE — 36415 COLL VENOUS BLD VENIPUNCTURE: CPT

## 2024-03-07 PROCEDURE — 80053 COMPREHEN METABOLIC PANEL: CPT

## 2024-03-07 PROCEDURE — 96365 THER/PROPH/DIAG IV INF INIT: CPT | Mod: INF

## 2024-03-07 PROCEDURE — 3077F SYST BP >= 140 MM HG: CPT | Performed by: INTERNAL MEDICINE

## 2024-03-07 PROCEDURE — 3080F DIAST BP >= 90 MM HG: CPT | Performed by: INTERNAL MEDICINE

## 2024-03-07 PROCEDURE — 96361 HYDRATE IV INFUSION ADD-ON: CPT | Mod: INF

## 2024-03-07 RX ORDER — FAMOTIDINE 10 MG/ML
20 INJECTION INTRAVENOUS ONCE AS NEEDED
Status: CANCELLED | OUTPATIENT
Start: 2024-04-04

## 2024-03-07 RX ORDER — EPINEPHRINE 0.3 MG/.3ML
0.3 INJECTION SUBCUTANEOUS EVERY 5 MIN PRN
Status: DISCONTINUED | OUTPATIENT
Start: 2024-03-07 | End: 2024-03-07 | Stop reason: HOSPADM

## 2024-03-07 RX ORDER — EPINEPHRINE 0.3 MG/.3ML
0.3 INJECTION SUBCUTANEOUS EVERY 5 MIN PRN
Status: CANCELLED | OUTPATIENT
Start: 2024-04-04

## 2024-03-07 RX ORDER — HEPARIN 100 UNIT/ML
500 SYRINGE INTRAVENOUS AS NEEDED
OUTPATIENT
Start: 2024-03-07

## 2024-03-07 RX ORDER — FAMOTIDINE 10 MG/ML
20 INJECTION INTRAVENOUS ONCE AS NEEDED
Status: DISCONTINUED | OUTPATIENT
Start: 2024-03-07 | End: 2024-03-07 | Stop reason: HOSPADM

## 2024-03-07 RX ORDER — DIPHENHYDRAMINE HYDROCHLORIDE 50 MG/ML
50 INJECTION INTRAMUSCULAR; INTRAVENOUS AS NEEDED
Status: CANCELLED | OUTPATIENT
Start: 2024-04-04

## 2024-03-07 RX ORDER — ALBUTEROL SULFATE 0.83 MG/ML
3 SOLUTION RESPIRATORY (INHALATION) AS NEEDED
Status: DISCONTINUED | OUTPATIENT
Start: 2024-03-07 | End: 2024-03-07 | Stop reason: HOSPADM

## 2024-03-07 RX ORDER — ZOLEDRONIC ACID 0.04 MG/ML
4 INJECTION, SOLUTION INTRAVENOUS ONCE
Status: COMPLETED | OUTPATIENT
Start: 2024-03-07 | End: 2024-03-07

## 2024-03-07 RX ORDER — HEPARIN SODIUM,PORCINE/PF 10 UNIT/ML
50 SYRINGE (ML) INTRAVENOUS AS NEEDED
OUTPATIENT
Start: 2024-03-07

## 2024-03-07 RX ORDER — ALBUTEROL SULFATE 0.83 MG/ML
3 SOLUTION RESPIRATORY (INHALATION) AS NEEDED
Status: CANCELLED | OUTPATIENT
Start: 2024-04-04

## 2024-03-07 RX ORDER — DIPHENHYDRAMINE HYDROCHLORIDE 50 MG/ML
50 INJECTION INTRAMUSCULAR; INTRAVENOUS AS NEEDED
Status: DISCONTINUED | OUTPATIENT
Start: 2024-03-07 | End: 2024-03-07 | Stop reason: HOSPADM

## 2024-03-07 RX ADMIN — SODIUM CHLORIDE 500 ML: 9 INJECTION, SOLUTION INTRAVENOUS at 14:37

## 2024-03-07 RX ADMIN — ZOLEDRONIC ACID 4 MG: 0.04 INJECTION, SOLUTION INTRAVENOUS at 14:35

## 2024-03-07 ASSESSMENT — PAIN SCALES - GENERAL: PAINLEVEL: 0-NO PAIN

## 2024-03-07 NOTE — PROGRESS NOTES
LOCATION:  [facility] at [facility].     HEMATOLOGY & ONCOLOGY PROBLEMS:  1. Stage IA (pT1c N0 cM0) left breast IDC; G1 ER/MT +, HER2 -trever; low risk Mammaprint.      a.  S/p partial mastectomy with SLNbx in June 2021,       b.  S/p radiation in Aug 2021.      c.  Anastrozole since Sep 2021. Changed to Exemestane in April 2022 due to arthralgias.     CHIEF COMPLAINT:    The patient is in the clinic today for oncology evaluation of breast cancer and for continuation of therapy and management of treatment-related effects                     HISTORY:   Ms. Rousseau is a 64-year-old pleasant female with a history of breast cancer.  She was noted to have abnormal finding on routine screening mammogram in April 2021.  Follow-up diagnostic mammogram  showed persistent architectural distortion in the left breast with ultrasound revealing 1.4 x 1.1 cm mass at 3 o'clock position 6 cm from the nipple.  Ultrasound-guided biopsy of the left breast in May 2021 confirmed invasive ductal carcinoma G1, ER+  (95%), MT+ (20%), HER2 negative. Mammogram low risk.  She eventually had a left partial mastectomy and SLNBx in June 2021 by Dr. Beckett revealing 1.9 cm G1 IDC, DCIS present, negative margins, focal 1 mm new medial margin on DCIS. 2 negative LNs. Stage pT1c  pN0 CM0.  BRCA2 variant of uncertain significance identified. Tumor board recommendation was for adjuvant radiation and endocrine therapy.  She completed radiation in August 2021 with Dr. Ring at [facility] and is maintained on extended adjuvant hormonal  therapy since September 2021.  She was initially started on anastrozole but that was changed to exemestane in April 2022 due to issues with arthralgias.  She is also maintained on 6 monthly Zometa.  Clinical course has also been complicated by probable  radiation-induced fibrosis.     INTERVAL HISTORY:  Mammogram from 1/17/2024 showed an indeterminate left axillary mass.  But subsequent ultrasound-guided biopsy from  2024 was unremarkable. Results were unremarkable and mainly suggestive of fibrotic changes.  She is recently being treated with steroids for newly diagnosed PMR. No history of nausea,  vomiting, fever, night sweats, diarrhea, rash, anorexia or weight loss. No recent changes in medications.       PAST MEDICAL HISTORY:   1.  Breast cancer as detailed above.   2.  Hypertension   3.  Obstructive sleep apnea   4.  Hyperlipidemia   5.  Hypothyroidism   6.  Nicotine dependence   7.  Polymyalgia rheumatica  8.   x2, eye, and bowel obstruction surgeries     SOCIAL HISTORY:   She is single and lives with her brother in [place].  1 pack a day for 40 years smoking history. Nonalcoholic.  She works as a  for [employer].  Born and raised in [place]     FAMILY HISTORY:    Father  at age 62 from myocardial infarction and mother  from colorectal cancer at age 68.  1 brother, 2 sons and 3 grandkids ~ all alive and well.  N o other specific history of bleeding, clotting or malignant disorder in the family.     OB/GYN HISTORY:  Postmenopausal.  . Menarche at age 17 and Menopause 56. S/p hysterectomy     REVIEW OF SYSTEMS:  Pertinent finding as per the history above.  All other systems have been reviewed and generally negative and noncontributory.     ALLERGY & MEDICATIONS:  Allergies and latest outpatient medications list were reviewed in the EMR.    VITAL SIGNS  BSA: 2.21 meters squared  BP (!) 154/93   Pulse 98   Temp 36.6 °C (97.9 °F) (Temporal)   Resp 22   Wt 108 kg (238 lb 1.6 oz)   SpO2 92%   BMI 40.87 kg/m²     PHYSICAL EXAMINATION:  Detailed examination not done.    LAB RESULTS:  CBC and metabolic profile from today is all unremarkable other than slightly elevated WBC of 13.6.  Last 3 sets of blood work were reviewed and the trend was noted.     RADIOLOGY RESULTS:  Mammogram from 2024 showed an indeterminate left axillary mass.  But subsequent ultrasound-guided biopsy from  1/22/2024 was unremarkable.    ASSESSMENT & PLAN:  1. Stage IA (pT1c N0 cM0) left breast IDC; G1 ER/HI +, HER2 -trever; low risk Mammaprint.  Please refer to the details of initial presentation and management as outlined above. In summary, she was noted to have abnormal finding on routine screening mammogram in April 2021.  Follow-up diagnostic mammogram showed persistent architectural distortion  in the left breast with ultrasound revealing 1.4 x 1.1 cm mass at 3 o'clock position 6 cm from the nipple.  Ultrasound-guided biopsy of the left breast in May 2021 confirmed invasive ductal carcinoma G1, ER+ (95%), HI+ (20%), HER2 negative. Mammogram  low risk.  She eventually had a left partial mastectomy and SLNBx in June 2021 by Dr. Beckett revealing 1.9 cm G1 IDC, DCIS present, negative margins, focal 1 mm new medial margin on DCIS. 2 negative LNs. Stage pT1c pN0 CM0.  BRCA2 variant of uncertain significance  identified. Tumor board recommendation was for adjuvant radiation and endocrine therapy.  She completed radiation in August 2021 with Dr. Ring at [facility] and is maintained on extended adjuvant hormonal therapy since September 2021.  She was initially  started on anastrozole but that was changed to exemestane in April 2022 due to issues with arthralgias.  She is also maintained on 6 monthly Zometa.  Clinical course has also been complicated by probable radiation-induced fibrosis.     Long discussion with the patient and she is explained that for now we will continue with the extended adjuvant hormonal therapy with exemestane  at the current dose and schedule.  We will continue monitor closely with regular physical examination, blood work, mammogram and DEXA scans.     2.  Osteopenia. She will continue with Zometa.    3.  Follow-up.  She will return to the clinic in 6 months.     This note has been transcribed using Dragon voice recognition system and there is a possibility of unintentional typing misprints.

## 2024-03-11 DIAGNOSIS — E78.5 HYPERLIPIDEMIA, UNSPECIFIED: Primary | ICD-10-CM

## 2024-03-11 DIAGNOSIS — E11.9 TYPE 2 DIABETES MELLITUS WITHOUT COMPLICATIONS (MULTI): ICD-10-CM

## 2024-03-13 ENCOUNTER — LAB (OUTPATIENT)
Dept: LAB | Facility: LAB | Age: 64
End: 2024-03-13
Payer: COMMERCIAL

## 2024-03-13 DIAGNOSIS — E78.5 HYPERLIPIDEMIA, UNSPECIFIED: ICD-10-CM

## 2024-03-13 DIAGNOSIS — E11.9 TYPE 2 DIABETES MELLITUS WITHOUT COMPLICATIONS (MULTI): ICD-10-CM

## 2024-03-13 LAB
ALBUMIN SERPL BCP-MCNC: 4.3 G/DL (ref 3.4–5)
ALP SERPL-CCNC: 50 U/L (ref 33–136)
ALT SERPL W P-5'-P-CCNC: 30 U/L (ref 7–45)
ANION GAP SERPL CALC-SCNC: 13 MMOL/L (ref 10–20)
AST SERPL W P-5'-P-CCNC: 17 U/L (ref 9–39)
BILIRUB SERPL-MCNC: 0.6 MG/DL (ref 0–1.2)
BUN SERPL-MCNC: 14 MG/DL (ref 6–23)
CALCIUM SERPL-MCNC: 9.2 MG/DL (ref 8.6–10.3)
CHLORIDE SERPL-SCNC: 99 MMOL/L (ref 98–107)
CHOLEST SERPL-MCNC: 175 MG/DL (ref 0–199)
CHOLESTEROL/HDL RATIO: 2.9
CO2 SERPL-SCNC: 33 MMOL/L (ref 21–32)
CREAT SERPL-MCNC: 0.84 MG/DL (ref 0.5–1.05)
CREAT UR-MCNC: 222.5 MG/DL (ref 20–320)
EGFRCR SERPLBLD CKD-EPI 2021: 78 ML/MIN/1.73M*2
EST. AVERAGE GLUCOSE BLD GHB EST-MCNC: 148 MG/DL
GLUCOSE SERPL-MCNC: 114 MG/DL (ref 74–99)
HBA1C MFR BLD: 6.8 %
HDLC SERPL-MCNC: 60.1 MG/DL
LDLC SERPL CALC-MCNC: 72 MG/DL
MICROALBUMIN UR-MCNC: 58.1 MG/L
MICROALBUMIN/CREAT UR: 26.1 UG/MG CREAT
NON HDL CHOLESTEROL: 115 MG/DL (ref 0–149)
POTASSIUM SERPL-SCNC: 4.2 MMOL/L (ref 3.5–5.3)
PROT SERPL-MCNC: 6.7 G/DL (ref 6.4–8.2)
SODIUM SERPL-SCNC: 141 MMOL/L (ref 136–145)
T4 FREE SERPL-MCNC: 1.15 NG/DL (ref 0.61–1.12)
TRIGL SERPL-MCNC: 214 MG/DL (ref 0–149)
TSH SERPL-ACNC: 2.9 MIU/L (ref 0.44–3.98)
VLDL: 43 MG/DL (ref 0–40)

## 2024-03-13 PROCEDURE — 84439 ASSAY OF FREE THYROXINE: CPT

## 2024-03-13 PROCEDURE — 82043 UR ALBUMIN QUANTITATIVE: CPT

## 2024-03-13 PROCEDURE — 84443 ASSAY THYROID STIM HORMONE: CPT

## 2024-03-13 PROCEDURE — 80053 COMPREHEN METABOLIC PANEL: CPT

## 2024-03-13 PROCEDURE — 83036 HEMOGLOBIN GLYCOSYLATED A1C: CPT

## 2024-03-13 PROCEDURE — 80061 LIPID PANEL: CPT

## 2024-03-13 PROCEDURE — 82570 ASSAY OF URINE CREATININE: CPT

## 2024-03-13 PROCEDURE — 36415 COLL VENOUS BLD VENIPUNCTURE: CPT

## 2024-03-15 DIAGNOSIS — G89.29 OTHER CHRONIC PAIN: ICD-10-CM

## 2024-03-15 RX ORDER — CELECOXIB 200 MG/1
200 CAPSULE ORAL 2 TIMES DAILY
Qty: 60 CAPSULE | Refills: 11 | Status: SHIPPED | OUTPATIENT
Start: 2024-03-15 | End: 2025-03-15

## 2024-04-10 DIAGNOSIS — Z17.0 MALIGNANT NEOPLASM OF UPPER-INNER QUADRANT OF LEFT BREAST IN FEMALE, ESTROGEN RECEPTOR POSITIVE (MULTI): Primary | ICD-10-CM

## 2024-04-10 DIAGNOSIS — C50.212 MALIGNANT NEOPLASM OF UPPER-INNER QUADRANT OF LEFT BREAST IN FEMALE, ESTROGEN RECEPTOR POSITIVE (MULTI): Primary | ICD-10-CM

## 2024-05-02 ENCOUNTER — APPOINTMENT (OUTPATIENT)
Dept: HEMATOLOGY/ONCOLOGY | Facility: HOSPITAL | Age: 64
End: 2024-05-02
Payer: COMMERCIAL

## 2024-05-02 ENCOUNTER — APPOINTMENT (OUTPATIENT)
Dept: HEMATOLOGY/ONCOLOGY | Facility: CLINIC | Age: 64
End: 2024-05-02
Payer: COMMERCIAL

## 2024-05-04 ENCOUNTER — HOSPITAL ENCOUNTER (OUTPATIENT)
Dept: RADIOLOGY | Facility: CLINIC | Age: 64
Discharge: HOME | End: 2024-05-04
Payer: COMMERCIAL

## 2024-05-04 DIAGNOSIS — Z17.0 MALIGNANT NEOPLASM OF UPPER-INNER QUADRANT OF LEFT BREAST IN FEMALE, ESTROGEN RECEPTOR POSITIVE (MULTI): ICD-10-CM

## 2024-05-04 DIAGNOSIS — C50.212 MALIGNANT NEOPLASM OF UPPER-INNER QUADRANT OF LEFT BREAST IN FEMALE, ESTROGEN RECEPTOR POSITIVE (MULTI): ICD-10-CM

## 2024-05-04 PROCEDURE — 77080 DXA BONE DENSITY AXIAL: CPT | Performed by: RADIOLOGY

## 2024-05-04 PROCEDURE — 77080 DXA BONE DENSITY AXIAL: CPT

## 2024-06-07 ENCOUNTER — APPOINTMENT (OUTPATIENT)
Dept: HEMATOLOGY/ONCOLOGY | Facility: CLINIC | Age: 64
End: 2024-06-07
Payer: COMMERCIAL

## 2024-06-21 ENCOUNTER — APPOINTMENT (OUTPATIENT)
Dept: RADIOLOGY | Facility: HOSPITAL | Age: 64
End: 2024-06-21
Payer: COMMERCIAL

## 2024-06-21 ENCOUNTER — APPOINTMENT (OUTPATIENT)
Dept: SURGICAL ONCOLOGY | Facility: HOSPITAL | Age: 64
End: 2024-06-21
Payer: COMMERCIAL

## 2024-06-25 ENCOUNTER — HOSPITAL ENCOUNTER (OUTPATIENT)
Dept: RADIOLOGY | Facility: HOSPITAL | Age: 64
Discharge: HOME | End: 2024-06-25
Payer: COMMERCIAL

## 2024-06-25 ENCOUNTER — OFFICE VISIT (OUTPATIENT)
Dept: SURGICAL ONCOLOGY | Facility: HOSPITAL | Age: 64
End: 2024-06-25
Payer: COMMERCIAL

## 2024-06-25 VITALS
WEIGHT: 228.4 LBS | OXYGEN SATURATION: 93 % | HEART RATE: 87 BPM | DIASTOLIC BLOOD PRESSURE: 73 MMHG | BODY MASS INDEX: 39.2 KG/M2 | SYSTOLIC BLOOD PRESSURE: 149 MMHG | TEMPERATURE: 97 F | RESPIRATION RATE: 18 BRPM

## 2024-06-25 VITALS — BODY MASS INDEX: 38.58 KG/M2 | HEIGHT: 64 IN | WEIGHT: 226 LBS

## 2024-06-25 DIAGNOSIS — C50.212 MALIGNANT NEOPLASM OF UPPER-INNER QUADRANT OF LEFT BREAST IN FEMALE, ESTROGEN RECEPTOR POSITIVE (MULTI): Primary | ICD-10-CM

## 2024-06-25 DIAGNOSIS — Z85.3 PERSONAL HISTORY OF BREAST CANCER: ICD-10-CM

## 2024-06-25 DIAGNOSIS — R92.8 ABNORMAL FINDING ON BREAST IMAGING: ICD-10-CM

## 2024-06-25 DIAGNOSIS — Z17.0 MALIGNANT NEOPLASM OF UPPER-INNER QUADRANT OF LEFT BREAST IN FEMALE, ESTROGEN RECEPTOR POSITIVE (MULTI): Primary | ICD-10-CM

## 2024-06-25 PROCEDURE — 3078F DIAST BP <80 MM HG: CPT | Performed by: SURGERY

## 2024-06-25 PROCEDURE — 99214 OFFICE O/P EST MOD 30 MIN: CPT | Performed by: SURGERY

## 2024-06-25 PROCEDURE — 77062 BREAST TOMOSYNTHESIS BI: CPT | Performed by: RADIOLOGY

## 2024-06-25 PROCEDURE — 77062 BREAST TOMOSYNTHESIS BI: CPT

## 2024-06-25 PROCEDURE — 77066 DX MAMMO INCL CAD BI: CPT | Performed by: RADIOLOGY

## 2024-06-25 PROCEDURE — 3077F SYST BP >= 140 MM HG: CPT | Performed by: SURGERY

## 2024-06-25 RX ORDER — LORATADINE 10 MG/1
10 TABLET ORAL DAILY
COMMUNITY

## 2024-06-25 RX ORDER — CALCIUM CARBONATE 200(500)MG
1 TABLET,CHEWABLE ORAL DAILY
COMMUNITY

## 2024-06-25 ASSESSMENT — PAIN SCALES - GENERAL: PAINLEVEL: 0-NO PAIN

## 2024-06-25 ASSESSMENT — PATIENT HEALTH QUESTIONNAIRE - PHQ9
1. LITTLE INTEREST OR PLEASURE IN DOING THINGS: NOT AT ALL
SUM OF ALL RESPONSES TO PHQ9 QUESTIONS 1 AND 2: 0
2. FEELING DOWN, DEPRESSED OR HOPELESS: NOT AT ALL

## 2024-06-25 ASSESSMENT — COLUMBIA-SUICIDE SEVERITY RATING SCALE - C-SSRS
1. IN THE PAST MONTH, HAVE YOU WISHED YOU WERE DEAD OR WISHED YOU COULD GO TO SLEEP AND NOT WAKE UP?: NO
2. HAVE YOU ACTUALLY HAD ANY THOUGHTS OF KILLING YOURSELF?: NO
6. HAVE YOU EVER DONE ANYTHING, STARTED TO DO ANYTHING, OR PREPARED TO DO ANYTHING TO END YOUR LIFE?: NO

## 2024-06-25 NOTE — PROGRESS NOTES
Chief Complaint     FU  left breast cancer  left breast post treatment fibrosis.      History of Present Illness  Referred by carie barreto     64-year-old not Ashkenazi Synagogue,  female here for FU  left breast cancer  she is here alone     History:  1) bilateral excision of excess axillary tissue  2) 4/22/2021 BL screening. het dense. right negative, left asymmetry; BIRADS 0  3) 5/19/2021 left breast diagnostic imaging. Left breast architectural distortion persists. Targeted ultrasound. At 3:00 6 cm from the nipple a 1.4 x 1.1 cm irregular mass is noted, BI-RADS 5. This corresponds to the mammographic finding. Left axillary ultrasound is negative.  4) 5/27/2021 left breast ultrasound-guided core biopsy with clip. Final pathology reveals grade 1 invasive ductal carcinoma. ER greater than 95%, KY 20. HER-2 negative. Low risk MammaPrint. open coil hydro-Dmitry in good position.  5) 624, 2021. Left partial mastectomy, left sentinel lymph node biopsy. Final pathology reveals 1.8 cm grade 1 invasive ductal carcinoma. Margins negative. 2 - lymph nodes.  6) completed radiation. complicated by pericarditis   7) Arimidex--> exemestane (moftakhar).  8) genetic testing with VUS in BRCA2  9) 516, 2022. Heterogeneously dense. BI-RADS 2.  10) 5/30/2023. BL MG. left BIRADS 3  11) 6/16/2023 BL breast MRI. right neg. left NME BIRADS 4  12) 6/29/2023 left US BIRADS 4  13) 6/29/2023 2 site bx both sites show fat necrosis and inflammation. concordant.   14) January 17, 2024.  Left breast follow-up imaging.  Left BI-RADS 4.  15) 1/22/2024 left mass bx benign node  16) June 25, 2024.  Bilateral mammogram.  BI-RADS 2     She presents today for routine FU. BL MG today     Ob/gyn history  menarche 17  menopause 56  G 2 P 2, age of first delivery 20  15 years OCPs, no fertility treatments, no HRT     No family history of breast or ovarian cancer.  Mother with colon cancer.        Review of Systems  A comprehensive ROS was taken  on the patient intake form and reviewed with the patient. This form is scanned into the electronic medical record.     Constitutional symptoms: Denies generalized fatigue. Denies weight change, fevers/chills, difficulty sleeping   Eyes: Denies double vision, glaucoma, cataracts.  Ear/nose/throat/mouth: + hearing changes, sore throat, sinus problems.  Cardiovascular: No chest pain. Denies irregular heartbeat. Denies ankle swelling.  Respiratory: + wheezing, cough, or shortness of breath.  Gastrointestinal: No abdominal pain, No nausea/vomiting. No indigestion/heartburn. No change in bowel habits. + constipation or diarrhea.   Genitourinary: No urinary incontinence. + urinary frequency. No painful urination.  Musculoskeletal: No bone pain, no muscle pain, + joint pain.   Integumentary: No rash. No masses. No changes in moles. No easy bruising.  Neurological: No headaches. No tremors. + numbness/tingling.  Psychiatric: No anxiety. No depression.  Endocrine: No excessive thirst. Not too hot or too cold. + tired or fatigued.   Hematological/lymphatic: No swollen glands or blood clotting problems. No bruising.      Physical Exam  A chaperone was offered for all portions of the physical exam. The patient declined.      General appearance: appears stated age, alert and oriented x 3  Head: Normocephalic, atraumatic  Eyes: conjunctivae/corneas clear.  Ears: External ears are normal, hearing is grossly intact.  Lungs: normal breathing  Heart: regular   Abdomen: Soft, nontender, nondistended.  Neurologic: grossly normal  Lymph nodes: No cervical, supraclavicular or axillary lymphadenopathy bilaterally  Bilateral low axillary incisions are healed.     Breast: A comprehensive breast exam was performed in the seated and supine positions. left breast is contracted and significantly smaller than the right. Bilateral nipples are everted. There are no skin changes on arm maneuvers. Bra size: D            Right: no masses             Left: healed incision.  No masses.  Treatment change that is softer    Provider Impressions     1) 64-year-old not Ashkenazi Yazidi,  female here with new left breast cancer cT1 cN0 grade 1 IDC, ER > 95% OR 20% Her2 negative. mammaprint low. now s/p left PM/SLNB pT1c pN0 margins neg. completed XRT. arimidex--> exemestane-->not tolerating. left breast radiation fibrosis.  No evidence of recurrence  2) co morbidities include anxiety, COPD, HTN. current tobacco use.   3) No FH of breast or ovarian cancer     Patient Discussion/Summary     She is here alone today.  She continues on her aromatase inhibitor.  Clinical exam is improving.  Bilateral mammogram today showed is negative for suspicious findings.  She is happy to hear this.  She can follow-up in 1 year with a mammogram and see the nurse practitioner.  She should call with any sooner concerns.

## 2024-07-12 ENCOUNTER — LAB (OUTPATIENT)
Dept: LAB | Facility: LAB | Age: 64
End: 2024-07-12
Payer: COMMERCIAL

## 2024-07-12 DIAGNOSIS — E11.9 TYPE 2 DIABETES MELLITUS WITHOUT COMPLICATIONS (MULTI): ICD-10-CM

## 2024-07-12 DIAGNOSIS — E03.9 HYPOTHYROIDISM, UNSPECIFIED: Primary | ICD-10-CM

## 2024-07-12 DIAGNOSIS — E55.9 VITAMIN D DEFICIENCY, UNSPECIFIED: ICD-10-CM

## 2024-07-12 LAB
25(OH)D3 SERPL-MCNC: 34 NG/ML (ref 30–100)
ALBUMIN SERPL BCP-MCNC: 4.5 G/DL (ref 3.4–5)
ALP SERPL-CCNC: 49 U/L (ref 33–136)
ALT SERPL W P-5'-P-CCNC: 27 U/L (ref 7–45)
ANION GAP SERPL CALC-SCNC: 12 MMOL/L (ref 10–20)
AST SERPL W P-5'-P-CCNC: 13 U/L (ref 9–39)
BILIRUB SERPL-MCNC: 0.4 MG/DL (ref 0–1.2)
BUN SERPL-MCNC: 11 MG/DL (ref 6–23)
CALCIUM SERPL-MCNC: 9.8 MG/DL (ref 8.6–10.3)
CHLORIDE SERPL-SCNC: 99 MMOL/L (ref 98–107)
CO2 SERPL-SCNC: 31 MMOL/L (ref 21–32)
CREAT SERPL-MCNC: 0.7 MG/DL (ref 0.5–1.05)
EGFRCR SERPLBLD CKD-EPI 2021: >90 ML/MIN/1.73M*2
GLUCOSE SERPL-MCNC: 117 MG/DL (ref 74–99)
POTASSIUM SERPL-SCNC: 4.8 MMOL/L (ref 3.5–5.3)
PROT SERPL-MCNC: 7 G/DL (ref 6.4–8.2)
SODIUM SERPL-SCNC: 137 MMOL/L (ref 136–145)

## 2024-07-12 PROCEDURE — 36415 COLL VENOUS BLD VENIPUNCTURE: CPT

## 2024-07-12 PROCEDURE — 82306 VITAMIN D 25 HYDROXY: CPT

## 2024-07-12 PROCEDURE — 83036 HEMOGLOBIN GLYCOSYLATED A1C: CPT

## 2024-07-12 PROCEDURE — 80053 COMPREHEN METABOLIC PANEL: CPT

## 2024-07-14 LAB
EST. AVERAGE GLUCOSE BLD GHB EST-MCNC: 143 MG/DL
HBA1C MFR BLD: 6.6 %

## 2024-09-06 ENCOUNTER — APPOINTMENT (OUTPATIENT)
Dept: HEMATOLOGY/ONCOLOGY | Facility: CLINIC | Age: 64
End: 2024-09-06
Payer: COMMERCIAL

## 2024-09-12 ENCOUNTER — LAB (OUTPATIENT)
Dept: LAB | Facility: CLINIC | Age: 64
End: 2024-09-12
Payer: COMMERCIAL

## 2024-09-12 ENCOUNTER — INFUSION (OUTPATIENT)
Dept: HEMATOLOGY/ONCOLOGY | Facility: CLINIC | Age: 64
End: 2024-09-12
Payer: COMMERCIAL

## 2024-09-12 ENCOUNTER — OFFICE VISIT (OUTPATIENT)
Dept: HEMATOLOGY/ONCOLOGY | Facility: CLINIC | Age: 64
End: 2024-09-12
Payer: COMMERCIAL

## 2024-09-12 VITALS
DIASTOLIC BLOOD PRESSURE: 48 MMHG | TEMPERATURE: 97.9 F | HEART RATE: 92 BPM | BODY MASS INDEX: 37.41 KG/M2 | SYSTOLIC BLOOD PRESSURE: 128 MMHG | OXYGEN SATURATION: 92 % | WEIGHT: 219.14 LBS | RESPIRATION RATE: 22 BRPM | HEIGHT: 64 IN

## 2024-09-12 VITALS
SYSTOLIC BLOOD PRESSURE: 124 MMHG | RESPIRATION RATE: 20 BRPM | HEART RATE: 88 BPM | TEMPERATURE: 98.4 F | OXYGEN SATURATION: 93 % | DIASTOLIC BLOOD PRESSURE: 88 MMHG

## 2024-09-12 DIAGNOSIS — C50.212 MALIGNANT NEOPLASM OF UPPER-INNER QUADRANT OF LEFT BREAST IN FEMALE, ESTROGEN RECEPTOR POSITIVE (MULTI): ICD-10-CM

## 2024-09-12 DIAGNOSIS — Z17.0 MALIGNANT NEOPLASM OF UPPER-INNER QUADRANT OF LEFT BREAST IN FEMALE, ESTROGEN RECEPTOR POSITIVE (MULTI): ICD-10-CM

## 2024-09-12 DIAGNOSIS — Z17.0 MALIGNANT NEOPLASM OF UPPER-INNER QUADRANT OF LEFT BREAST IN FEMALE, ESTROGEN RECEPTOR POSITIVE: ICD-10-CM

## 2024-09-12 DIAGNOSIS — C50.212 MALIGNANT NEOPLASM OF UPPER-INNER QUADRANT OF LEFT BREAST IN FEMALE, ESTROGEN RECEPTOR POSITIVE: ICD-10-CM

## 2024-09-12 LAB
ALBUMIN SERPL BCP-MCNC: 4.2 G/DL (ref 3.4–5)
ALP SERPL-CCNC: 53 U/L (ref 33–136)
ALT SERPL W P-5'-P-CCNC: 17 U/L (ref 7–45)
ANION GAP SERPL CALC-SCNC: 10 MMOL/L (ref 10–20)
AST SERPL W P-5'-P-CCNC: 12 U/L (ref 9–39)
BASOPHILS # BLD AUTO: 0.02 X10*3/UL (ref 0–0.1)
BASOPHILS NFR BLD AUTO: 0.4 %
BILIRUB SERPL-MCNC: 0.4 MG/DL (ref 0–1.2)
BUN SERPL-MCNC: 9 MG/DL (ref 6–23)
CALCIUM SERPL-MCNC: 9.2 MG/DL (ref 8.6–10.3)
CHLORIDE SERPL-SCNC: 102 MMOL/L (ref 98–107)
CO2 SERPL-SCNC: 29 MMOL/L (ref 21–32)
CREAT SERPL-MCNC: 0.62 MG/DL (ref 0.5–1.05)
EGFRCR SERPLBLD CKD-EPI 2021: >90 ML/MIN/1.73M*2
EOSINOPHIL # BLD AUTO: 0.31 X10*3/UL (ref 0–0.7)
EOSINOPHIL NFR BLD AUTO: 5.4 %
ERYTHROCYTE [DISTWIDTH] IN BLOOD BY AUTOMATED COUNT: 12.8 % (ref 11.5–14.5)
GLUCOSE SERPL-MCNC: 146 MG/DL (ref 74–99)
HCT VFR BLD AUTO: 46.2 % (ref 36–46)
HGB BLD-MCNC: 15.4 G/DL (ref 12–16)
IMM GRANULOCYTES # BLD AUTO: 0.02 X10*3/UL (ref 0–0.7)
IMM GRANULOCYTES NFR BLD AUTO: 0.4 % (ref 0–0.9)
LYMPHOCYTES # BLD AUTO: 1.81 X10*3/UL (ref 1.2–4.8)
LYMPHOCYTES NFR BLD AUTO: 31.8 %
MAGNESIUM SERPL-MCNC: 1.73 MG/DL (ref 1.6–2.4)
MCH RBC QN AUTO: 32.8 PG (ref 26–34)
MCHC RBC AUTO-ENTMCNC: 33.3 G/DL (ref 32–36)
MCV RBC AUTO: 98 FL (ref 80–100)
MONOCYTES # BLD AUTO: 0.48 X10*3/UL (ref 0.1–1)
MONOCYTES NFR BLD AUTO: 8.4 %
NEUTROPHILS # BLD AUTO: 3.05 X10*3/UL (ref 1.2–7.7)
NEUTROPHILS NFR BLD AUTO: 53.6 %
NRBC BLD-RTO: 0 /100 WBCS (ref 0–0)
PHOSPHATE SERPL-MCNC: 2.9 MG/DL (ref 2.5–4.9)
PLATELET # BLD AUTO: 165 X10*3/UL (ref 150–450)
POTASSIUM SERPL-SCNC: 4 MMOL/L (ref 3.5–5.3)
PROT SERPL-MCNC: 6.8 G/DL (ref 6.4–8.2)
RBC # BLD AUTO: 4.7 X10*6/UL (ref 4–5.2)
SODIUM SERPL-SCNC: 137 MMOL/L (ref 136–145)
WBC # BLD AUTO: 5.7 X10*3/UL (ref 4.4–11.3)

## 2024-09-12 PROCEDURE — 3078F DIAST BP <80 MM HG: CPT | Performed by: INTERNAL MEDICINE

## 2024-09-12 PROCEDURE — 99214 OFFICE O/P EST MOD 30 MIN: CPT | Performed by: INTERNAL MEDICINE

## 2024-09-12 PROCEDURE — 85025 COMPLETE CBC W/AUTO DIFF WBC: CPT

## 2024-09-12 PROCEDURE — 84075 ASSAY ALKALINE PHOSPHATASE: CPT

## 2024-09-12 PROCEDURE — 83735 ASSAY OF MAGNESIUM: CPT

## 2024-09-12 PROCEDURE — 3008F BODY MASS INDEX DOCD: CPT | Performed by: INTERNAL MEDICINE

## 2024-09-12 PROCEDURE — 2500000004 HC RX 250 GENERAL PHARMACY W/ HCPCS (ALT 636 FOR OP/ED): Performed by: INTERNAL MEDICINE

## 2024-09-12 PROCEDURE — 96361 HYDRATE IV INFUSION ADD-ON: CPT | Mod: INF

## 2024-09-12 PROCEDURE — 3074F SYST BP LT 130 MM HG: CPT | Performed by: INTERNAL MEDICINE

## 2024-09-12 PROCEDURE — 99214 OFFICE O/P EST MOD 30 MIN: CPT | Mod: 25 | Performed by: INTERNAL MEDICINE

## 2024-09-12 PROCEDURE — 96365 THER/PROPH/DIAG IV INF INIT: CPT | Mod: INF

## 2024-09-12 PROCEDURE — 84100 ASSAY OF PHOSPHORUS: CPT

## 2024-09-12 PROCEDURE — 36415 COLL VENOUS BLD VENIPUNCTURE: CPT

## 2024-09-12 RX ORDER — EPINEPHRINE 0.3 MG/.3ML
0.3 INJECTION SUBCUTANEOUS EVERY 5 MIN PRN
OUTPATIENT
Start: 2025-02-23

## 2024-09-12 RX ORDER — ZOLEDRONIC ACID 0.04 MG/ML
4 INJECTION, SOLUTION INTRAVENOUS ONCE
Status: COMPLETED | OUTPATIENT
Start: 2024-09-12 | End: 2024-09-12

## 2024-09-12 RX ORDER — ALBUTEROL SULFATE 0.83 MG/ML
3 SOLUTION RESPIRATORY (INHALATION) AS NEEDED
OUTPATIENT
Start: 2025-02-23

## 2024-09-12 RX ORDER — FAMOTIDINE 10 MG/ML
20 INJECTION INTRAVENOUS ONCE AS NEEDED
OUTPATIENT
Start: 2025-02-23

## 2024-09-12 RX ORDER — DIPHENHYDRAMINE HYDROCHLORIDE 50 MG/ML
50 INJECTION INTRAMUSCULAR; INTRAVENOUS AS NEEDED
OUTPATIENT
Start: 2025-02-23

## 2024-09-12 ASSESSMENT — PAIN SCALES - GENERAL: PAINLEVEL: 0-NO PAIN

## 2024-09-12 NOTE — PROGRESS NOTES
LOCATION:  [facility] at [facility].     HEMATOLOGY & ONCOLOGY PROBLEMS:  1. Stage IA (pT1c N0 cM0) left breast IDC; G1 ER/KS +, HER2 -trever; low risk Mammaprint.      a.  S/p partial mastectomy with SLNbx in June 2021,       b.  S/p radiation in Aug 2021.      c.  Anastrozole since Sep 2021. Changed to Exemestane in April 2022 due to arthralgias.     CHIEF COMPLAINT:    The patient is in the clinic today for oncology evaluation of breast cancer and for continuation of therapy and management of treatment-related effects                     HISTORY:   Ms. Rousseau is a 64-year-old pleasant female with a history of breast cancer.  She was noted to have abnormal finding on routine screening mammogram in April 2021.  Follow-up diagnostic mammogram  showed persistent architectural distortion in the left breast with ultrasound revealing 1.4 x 1.1 cm mass at 3 o'clock position 6 cm from the nipple.  Ultrasound-guided biopsy of the left breast in May 2021 confirmed invasive ductal carcinoma G1, ER+  (95%), KS+ (20%), HER2 negative. Mammogram low risk.  She eventually had a left partial mastectomy and SLNBx in June 2021 by Dr. Beckett revealing 1.9 cm G1 IDC, DCIS present, negative margins, focal 1 mm new medial margin on DCIS. 2 negative LNs. Stage pT1c  pN0 CM0.  BRCA2 variant of uncertain significance identified. Tumor board recommendation was for adjuvant radiation and endocrine therapy.  She completed radiation in August 2021 with Dr. Ring at [facility] and is maintained on extended adjuvant hormonal  therapy since September 2021.  She was initially started on anastrozole but that was changed to exemestane in April 2022 due to issues with arthralgias.  She is also maintained on 6 monthly Zometa.  Clinical course has also been complicated by probable  radiation-induced fibrosis.     INTERVAL HISTORY:  Complain of diffuse arthritic pain due to PMR. She recently held the exemestane for 2 weeks but there was no  "improvement in her symptomatology. No history of nausea,  vomiting, fever, night sweats, diarrhea, rash, anorexia or weight loss. No recent changes in medications.       PAST MEDICAL HISTORY:   1.  Breast cancer as detailed above.   2.  Hypertension   3.  Obstructive sleep apnea   4.  Hyperlipidemia   5.  Hypothyroidism   6.  Nicotine dependence   7.  Polymyalgia rheumatica  8.   x2, eye, and bowel obstruction surgeries     SOCIAL HISTORY:   She is single and lives with her brother in [place].  1 pack a day for 40 years smoking history. Nonalcoholic.  She retired in . Used to work as a  for [employer].  Born and raised in [place]     FAMILY HISTORY:    Father  at age 62 from myocardial infarction and mother  from colorectal cancer at age 68.  1 brother, 2 sons and 3 grandkids ~ all alive and well.  N o other specific history of bleeding, clotting or malignant disorder in the family.     OB/GYN HISTORY:  Postmenopausal.  . Menarche at age 17 and Menopause 56. S/p hysterectomy     REVIEW OF SYSTEMS:  Pertinent finding as per the history above.  All other systems have been reviewed and generally negative and noncontributory.     ALLERGY & MEDICATIONS:  Allergies and latest outpatient medications list were reviewed in the EMR.    VITAL SIGNS  BSA: 2.12 meters squared  BP (!) 128/48   Pulse 92   Temp 36.6 °C (97.9 °F)   Resp 22   Ht (S) 1.625 m (5' 3.98\")   Wt 99.4 kg (219 lb 2.2 oz)   SpO2 92%   BMI 37.64 kg/m²     PHYSICAL EXAMINATION:  Detailed examination not done.    LAB RESULTS:  CBC and metabolic profile from today is all unremarkable.  Last 3 sets of blood work were reviewed and the trend was noted.     RADIOLOGY RESULTS:  Mammogram 2024   IMPRESSION:  No mammographic evidence of malignancy.  Recommendation is for the patient to return for routine annual mammogram or sooner if clinically indicated.   BI-RADS CATEGORY:  2 Benign.  Recommendation:  Annual " Screening.  Recommended Date:  1 Year.  Laterality:  Bilateral.    ASSESSMENT & PLAN:  1. Stage IA (pT1c N0 cM0) left breast IDC; G1 ER/CT +, HER2 -trever; low risk Mammaprint.  Please refer to the details of initial presentation and management as outlined above. In summary, she was noted to have abnormal finding on routine screening mammogram in April 2021.  Follow-up diagnostic mammogram showed persistent architectural distortion  in the left breast with ultrasound revealing 1.4 x 1.1 cm mass at 3 o'clock position 6 cm from the nipple.  Ultrasound-guided biopsy of the left breast in May 2021 confirmed invasive ductal carcinoma G1, ER+ (95%), CT+ (20%), HER2 negative. Mammogram  low risk.  She eventually had a left partial mastectomy and SLNBx in June 2021 by Dr. Beckett revealing 1.9 cm G1 IDC, DCIS present, negative margins, focal 1 mm new medial margin on DCIS. 2 negative LNs. Stage pT1c pN0 CM0.  BRCA2 variant of uncertain significance  identified. Tumor board recommendation was for adjuvant radiation and endocrine therapy.  She completed radiation in August 2021 with Dr. Ring at [facility] and is maintained on extended adjuvant hormonal therapy since September 2021.  She was initially  started on anastrozole but that was changed to exemestane in April 2022 due to issues with arthralgias.  She is also maintained on 6 monthly Zometa.  Clinical course was also complicated by probable radiation-induced fibrosis.     Long discussion with the patient and she is explained that for now we will continue with the extended adjuvant hormonal therapy with exemestane at the current dose and schedule.  We will continue monitor closely with regular physical examination, blood work, mammogram and DEXA scans.     2.  Osteopenia. She will continue with Zometa.    3.  Follow-up.  She will return to the clinic in 6 months.     This note has been transcribed using Dragon voice recognition system and there is a possibility of unintentional  typing misprints.

## 2024-10-07 DIAGNOSIS — C50.212 MALIGNANT NEOPLASM OF UPPER-INNER QUADRANT OF LEFT BREAST IN FEMALE, ESTROGEN RECEPTOR POSITIVE: ICD-10-CM

## 2024-10-07 DIAGNOSIS — Z17.0 MALIGNANT NEOPLASM OF UPPER-INNER QUADRANT OF LEFT BREAST IN FEMALE, ESTROGEN RECEPTOR POSITIVE: ICD-10-CM

## 2024-10-07 RX ORDER — EXEMESTANE 25 MG/1
25 TABLET ORAL DAILY
Qty: 14 TABLET | Refills: 0 | Status: SHIPPED | OUTPATIENT
Start: 2024-10-07 | End: 2024-10-11 | Stop reason: SDUPTHER

## 2024-10-07 RX ORDER — EXEMESTANE 25 MG/1
25 TABLET ORAL DAILY
Qty: 90 TABLET | Refills: 3 | Status: SHIPPED | OUTPATIENT
Start: 2024-10-07 | End: 2024-10-07 | Stop reason: SDUPTHER

## 2024-10-11 DIAGNOSIS — C50.212 MALIGNANT NEOPLASM OF UPPER-INNER QUADRANT OF LEFT BREAST IN FEMALE, ESTROGEN RECEPTOR POSITIVE: ICD-10-CM

## 2024-10-11 DIAGNOSIS — Z17.0 MALIGNANT NEOPLASM OF UPPER-INNER QUADRANT OF LEFT BREAST IN FEMALE, ESTROGEN RECEPTOR POSITIVE: ICD-10-CM

## 2024-10-13 RX ORDER — EXEMESTANE 25 MG/1
25 TABLET ORAL DAILY
Qty: 90 TABLET | Refills: 3 | Status: SHIPPED | OUTPATIENT
Start: 2024-10-13

## 2025-03-12 ENCOUNTER — APPOINTMENT (OUTPATIENT)
Dept: HEMATOLOGY/ONCOLOGY | Facility: CLINIC | Age: 65
End: 2025-03-12
Payer: COMMERCIAL

## 2025-03-12 RX ORDER — DIPHENHYDRAMINE HYDROCHLORIDE 50 MG/ML
50 INJECTION INTRAMUSCULAR; INTRAVENOUS AS NEEDED
Status: CANCELLED | OUTPATIENT
Start: 2025-03-12

## 2025-03-12 RX ORDER — FAMOTIDINE 10 MG/ML
20 INJECTION, SOLUTION INTRAVENOUS ONCE AS NEEDED
Status: CANCELLED | OUTPATIENT
Start: 2025-03-12

## 2025-03-12 RX ORDER — HEPARIN 100 UNIT/ML
500 SYRINGE INTRAVENOUS AS NEEDED
OUTPATIENT
Start: 2025-03-12

## 2025-03-12 RX ORDER — ALBUTEROL SULFATE 0.83 MG/ML
3 SOLUTION RESPIRATORY (INHALATION) AS NEEDED
Status: CANCELLED | OUTPATIENT
Start: 2025-03-12

## 2025-03-12 RX ORDER — HEPARIN SODIUM,PORCINE/PF 10 UNIT/ML
50 SYRINGE (ML) INTRAVENOUS AS NEEDED
OUTPATIENT
Start: 2025-03-12

## 2025-03-12 RX ORDER — EPINEPHRINE 0.3 MG/.3ML
0.3 INJECTION SUBCUTANEOUS EVERY 5 MIN PRN
Status: CANCELLED | OUTPATIENT
Start: 2025-03-12

## 2025-03-13 ENCOUNTER — INFUSION (OUTPATIENT)
Dept: HEMATOLOGY/ONCOLOGY | Facility: CLINIC | Age: 65
End: 2025-03-13
Payer: COMMERCIAL

## 2025-03-13 ENCOUNTER — OFFICE VISIT (OUTPATIENT)
Dept: HEMATOLOGY/ONCOLOGY | Facility: CLINIC | Age: 65
End: 2025-03-13
Payer: COMMERCIAL

## 2025-03-13 ENCOUNTER — LAB (OUTPATIENT)
Dept: LAB | Facility: CLINIC | Age: 65
End: 2025-03-13
Payer: COMMERCIAL

## 2025-03-13 VITALS
BODY MASS INDEX: 37.11 KG/M2 | SYSTOLIC BLOOD PRESSURE: 113 MMHG | OXYGEN SATURATION: 94 % | WEIGHT: 216.05 LBS | RESPIRATION RATE: 20 BRPM | HEART RATE: 103 BPM | TEMPERATURE: 97.5 F | DIASTOLIC BLOOD PRESSURE: 63 MMHG

## 2025-03-13 DIAGNOSIS — C50.212 MALIGNANT NEOPLASM OF UPPER-INNER QUADRANT OF LEFT BREAST IN FEMALE, ESTROGEN RECEPTOR POSITIVE: Primary | ICD-10-CM

## 2025-03-13 DIAGNOSIS — C50.212 MALIGNANT NEOPLASM OF UPPER-INNER QUADRANT OF LEFT BREAST IN FEMALE, ESTROGEN RECEPTOR POSITIVE: ICD-10-CM

## 2025-03-13 DIAGNOSIS — Z17.0 MALIGNANT NEOPLASM OF UPPER-INNER QUADRANT OF LEFT BREAST IN FEMALE, ESTROGEN RECEPTOR POSITIVE: Primary | ICD-10-CM

## 2025-03-13 DIAGNOSIS — Z17.0 MALIGNANT NEOPLASM OF UPPER-INNER QUADRANT OF LEFT BREAST IN FEMALE, ESTROGEN RECEPTOR POSITIVE: ICD-10-CM

## 2025-03-13 LAB
ALBUMIN SERPL BCP-MCNC: 4.4 G/DL (ref 3.4–5)
ALP SERPL-CCNC: 43 U/L (ref 33–136)
ALT SERPL W P-5'-P-CCNC: 18 U/L (ref 7–45)
ANION GAP SERPL CALC-SCNC: 12 MMOL/L (ref 10–20)
AST SERPL W P-5'-P-CCNC: 13 U/L (ref 9–39)
BASOPHILS # BLD AUTO: 0.03 X10*3/UL (ref 0–0.1)
BASOPHILS NFR BLD AUTO: 0.4 %
BILIRUB SERPL-MCNC: 0.3 MG/DL (ref 0–1.2)
BUN SERPL-MCNC: 10 MG/DL (ref 6–23)
CALCIUM SERPL-MCNC: 10.1 MG/DL (ref 8.6–10.3)
CHLORIDE SERPL-SCNC: 100 MMOL/L (ref 98–107)
CO2 SERPL-SCNC: 30 MMOL/L (ref 21–32)
CREAT SERPL-MCNC: 0.74 MG/DL (ref 0.5–1.05)
EGFRCR SERPLBLD CKD-EPI 2021: 90 ML/MIN/1.73M*2
EOSINOPHIL # BLD AUTO: 0.25 X10*3/UL (ref 0–0.7)
EOSINOPHIL NFR BLD AUTO: 3.3 %
ERYTHROCYTE [DISTWIDTH] IN BLOOD BY AUTOMATED COUNT: 11.8 % (ref 11.5–14.5)
GLUCOSE SERPL-MCNC: 97 MG/DL (ref 74–99)
HCT VFR BLD AUTO: 45.6 % (ref 36–46)
HGB BLD-MCNC: 15 G/DL (ref 12–16)
IMM GRANULOCYTES # BLD AUTO: 0.02 X10*3/UL (ref 0–0.7)
IMM GRANULOCYTES NFR BLD AUTO: 0.3 % (ref 0–0.9)
LYMPHOCYTES # BLD AUTO: 2.2 X10*3/UL (ref 1.2–4.8)
LYMPHOCYTES NFR BLD AUTO: 28.7 %
MAGNESIUM SERPL-MCNC: 1.69 MG/DL (ref 1.6–2.4)
MCH RBC QN AUTO: 32.3 PG (ref 26–34)
MCHC RBC AUTO-ENTMCNC: 32.9 G/DL (ref 32–36)
MCV RBC AUTO: 98 FL (ref 80–100)
MONOCYTES # BLD AUTO: 0.5 X10*3/UL (ref 0.1–1)
MONOCYTES NFR BLD AUTO: 6.5 %
NEUTROPHILS # BLD AUTO: 4.67 X10*3/UL (ref 1.2–7.7)
NEUTROPHILS NFR BLD AUTO: 60.8 %
NRBC BLD-RTO: 0 /100 WBCS (ref 0–0)
PHOSPHATE SERPL-MCNC: 3.8 MG/DL (ref 2.5–4.9)
PLATELET # BLD AUTO: 188 X10*3/UL (ref 150–450)
POTASSIUM SERPL-SCNC: 3.9 MMOL/L (ref 3.5–5.3)
PROT SERPL-MCNC: 7.2 G/DL (ref 6.4–8.2)
RBC # BLD AUTO: 4.64 X10*6/UL (ref 4–5.2)
SODIUM SERPL-SCNC: 138 MMOL/L (ref 136–145)
WBC # BLD AUTO: 7.7 X10*3/UL (ref 4.4–11.3)

## 2025-03-13 PROCEDURE — 99214 OFFICE O/P EST MOD 30 MIN: CPT | Performed by: INTERNAL MEDICINE

## 2025-03-13 PROCEDURE — 80053 COMPREHEN METABOLIC PANEL: CPT

## 2025-03-13 PROCEDURE — 3074F SYST BP LT 130 MM HG: CPT | Performed by: INTERNAL MEDICINE

## 2025-03-13 PROCEDURE — 3078F DIAST BP <80 MM HG: CPT | Performed by: INTERNAL MEDICINE

## 2025-03-13 PROCEDURE — 84100 ASSAY OF PHOSPHORUS: CPT

## 2025-03-13 PROCEDURE — 85025 COMPLETE CBC W/AUTO DIFF WBC: CPT

## 2025-03-13 PROCEDURE — 2500000004 HC RX 250 GENERAL PHARMACY W/ HCPCS (ALT 636 FOR OP/ED): Performed by: INTERNAL MEDICINE

## 2025-03-13 PROCEDURE — 96365 THER/PROPH/DIAG IV INF INIT: CPT | Mod: INF

## 2025-03-13 PROCEDURE — 36415 COLL VENOUS BLD VENIPUNCTURE: CPT

## 2025-03-13 PROCEDURE — 83735 ASSAY OF MAGNESIUM: CPT

## 2025-03-13 PROCEDURE — 96360 HYDRATION IV INFUSION INIT: CPT | Mod: INF

## 2025-03-13 RX ORDER — EPINEPHRINE 0.3 MG/.3ML
0.3 INJECTION SUBCUTANEOUS EVERY 5 MIN PRN
OUTPATIENT
Start: 2025-08-10

## 2025-03-13 RX ORDER — ZOLEDRONIC ACID 0.04 MG/ML
4 INJECTION, SOLUTION INTRAVENOUS ONCE
OUTPATIENT
Start: 2025-08-10

## 2025-03-13 RX ORDER — FAMOTIDINE 10 MG/ML
20 INJECTION, SOLUTION INTRAVENOUS ONCE AS NEEDED
OUTPATIENT
Start: 2025-08-10

## 2025-03-13 RX ORDER — ZOLEDRONIC ACID 0.04 MG/ML
4 INJECTION, SOLUTION INTRAVENOUS ONCE
Status: COMPLETED | OUTPATIENT
Start: 2025-03-13 | End: 2025-03-13

## 2025-03-13 RX ORDER — DIPHENHYDRAMINE HYDROCHLORIDE 50 MG/ML
50 INJECTION INTRAMUSCULAR; INTRAVENOUS AS NEEDED
OUTPATIENT
Start: 2025-08-10

## 2025-03-13 RX ORDER — ALBUTEROL SULFATE 0.83 MG/ML
3 SOLUTION RESPIRATORY (INHALATION) AS NEEDED
OUTPATIENT
Start: 2025-08-10

## 2025-03-13 RX ADMIN — ZOLEDRONIC ACID 4 MG: 0.04 INJECTION, SOLUTION INTRAVENOUS at 14:35

## 2025-03-13 RX ADMIN — SODIUM CHLORIDE 500 ML: 9 INJECTION, SOLUTION INTRAVENOUS at 14:41

## 2025-03-13 ASSESSMENT — PAIN SCALES - GENERAL: PAINLEVEL_OUTOF10: 0-NO PAIN

## 2025-03-13 NOTE — PROGRESS NOTES
LOCATION:  [facility] at [facility].     HEMATOLOGY & ONCOLOGY PROBLEMS:  1. Stage IA (pT1c N0 cM0) left breast IDC; G1 ER/DE +, HER2 -trever; low risk Mammaprint.      a.  S/p partial mastectomy with SLNbx in June 2021,       b.  S/p radiation in Aug 2021.      c.  Anastrozole since Sep 2021. Changed to Exemestane in April 2022 due to arthralgias.     CHIEF COMPLAINT:    The patient is in the clinic today for oncology evaluation of breast cancer and for continuation of therapy and management of treatment-related effects                     HISTORY:   Ms. Rousseau is a 65-year-old pleasant female with a history of breast cancer.  She was noted to have abnormal finding on routine screening mammogram in April 2021.  Follow-up diagnostic mammogram  showed persistent architectural distortion in the left breast with ultrasound revealing 1.4 x 1.1 cm mass at 3 o'clock position 6 cm from the nipple.  Ultrasound-guided biopsy of the left breast in May 2021 confirmed invasive ductal carcinoma G1, ER+  (95%), DE+ (20%), HER2 negative. Mammogram low risk.  She eventually had a left partial mastectomy and SLNBx in June 2021 by Dr. Beckett revealing 1.9 cm G1 IDC, DCIS present, negative margins, focal 1 mm new medial margin on DCIS. 2 negative LNs. Stage pT1c  pN0 CM0.  BRCA2 variant of uncertain significance identified. Tumor board recommendation was for adjuvant radiation and endocrine therapy.  She completed radiation in Aug 2021 with Dr. Ring at [facility] and is maintained on extended adjuvant hormonal  therapy since September 2021.  She was initially started on anastrozole but that was changed to exemestane in April 2022 due to issues with arthralgias.  She is also maintained on 6 monthly Zometa.  Clinical course has also been complicated by probable  radiation-induced fibrosis.     INTERVAL HISTORY:  Persistent diffuse arthritic discomfort due to PMR. No history of nausea,  vomiting, fever, night sweats, diarrhea, rash,  anorexia or weight loss.  As per the patient, lately she is being tried on Ozempiq with improvement in hyperglycemia.      PAST MEDICAL HISTORY:   1.  Breast cancer as detailed above.   2.  Hypertension   3.  Obstructive sleep apnea   4.  Hyperlipidemia   5.  Hypothyroidism   6.  Nicotine dependence   7.  Polymyalgia rheumatica  8.   x2, eye, and bowel obstruction surgeries     SOCIAL HISTORY:   She is single and lives with her brother in [place].  1 pack a day for 40 years smoking history. Nonalcoholic.  She retired in . 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 Born and raised in [place].     FAMILY HISTORY:    Father  at age 62 from myocardial infarction and mother  from colorectal cancer at age 68. 1 brother, 2 sons and 3 grandkids ~ all alive and well.  No other specific history of bleeding, clotting or malignant disorder in the family.     OB/GYN HISTORY:  Postmenopausal.  . Menarche at age 17 and Menopause 56. S/p hysterectomy.     REVIEW OF SYSTEMS:  Pertinent finding as per the history above.  All other systems have been reviewed and generally negative and noncontributory.     ALLERGY & MEDICATIONS:  Allergies and latest outpatient medications list were reviewed in the EMR.    VITAL SIGNS  BSA: 2.1 meters squared  /63   Pulse 103   Temp 36.4 °C (97.5 °F)   Resp 20   Wt 98 kg (216 lb 0.8 oz)   SpO2 94%   BMI 37.11 kg/m²     PHYSICAL EXAMINATION:  Detailed examination not done.    LAB RESULTS:  CBC and metabolic profile from today is all unremarkable.  Last 3 sets of blood work were reviewed and the trend was noted.     RADIOLOGY RESULTS:  Mammogram 2024   IMPRESSION:  No mammographic evidence of malignancy.  Recommendation is for the patient to return for routine annual mammogram or sooner if clinically indicated.   BI-RADS CATEGORY:  2 Benign.  Recommendation:  Annual Screening.  Recommended Date:  1 Year.  Laterality:  Bilateral.    ASSESSMENT & PLAN:  1. Stage IA  (pT1c N0 cM0) left breast IDC; G1 ER/LA +, HER2 -trever; low risk Mammaprint.  Please refer to the details of initial presentation and management as outlined above. In summary, she was noted to have abnormal finding on routine screening mammogram in April 2021.  Follow-up diagnostic mammogram showed persistent architectural distortion  in the left breast with ultrasound revealing 1.4 x 1.1 cm mass at 3 o'clock position 6 cm from the nipple. Ultrasound guided biopsy of the left breast in May 2021 confirmed invasive ductal carcinoma G1, ER+ (95%), LA+ (20%), HER2 negative. Mammogram low risk.  She eventually had a left partial mastectomy and SLNBx in June 2021 by Dr. Beckett, revealing 1.9 cm G1 IDC, DCIS present, negative margins, focal 1 mm new medial margin on DCIS. 2 negative LNs. Stage pT1c pN0 CM0.  Genetic testing revealed BRCA2 variant.  She completed radiation in Aug 2021 with Dr. Ring at [facility] and is maintained on extended adjuvant hormonal therapy since Sep 2021.  She was initially started on anastrozole but that was changed to exemestane in April 2022 due to issues with arthralgias.  She is also maintained on 6 monthly Zometa.  Clinical course was also complicated by probable radiation-induced fibrosis.     Long discussion with the patient and she is explained that for now we will continue with the extended adjuvant hormonal therapy with exemestane at the current dose and schedule.  We will continue monitor closely with regular physical examination, blood work, mammogram and DEXA scans.     2.  Osteopenia. She will continue with Zometa.    3.  Follow-up.  She will return to the clinic in 12 months.     This note has been transcribed using Dragon voice recognition system and there is a possibility of unintentional typing misprints.

## 2025-03-13 NOTE — PATIENT INSTRUCTIONS
Please have your labs drawn several days prior to your next Dr. Starks followup visit. If you are going to outpatient / [facility] lab, note that you will need to have paper orders or ask us to fax orders over to your preferred location.  Thank you!

## 2025-06-24 NOTE — PROGRESS NOTES
Luma Schwab female   1960 65 y.o.   [id number]      Chief Complaint  Breast cancer surveillance     History Of Present Illness  Luma Schwab is a very pleasant 65 y.o. female presenting with breast cancer surveillance. 21 diagnosed with left breast biopsy demonstrated grade 1 invasive ductal carcinoma, ER+ (95%), UT+ (20%) and HER2 negative. Mammogram low risk. 21 Dr. Beckett performed a left partial mastectomy and sentinel lymph node biopsy. Final surgical pathology revealed 1.9 cm G1 IDC, DCIS present, negative margins, focal 1 mm new medial margin on DCIS. 2 negative LNs. She completed radiation therapy 2021. 2021 initiated anastrozole. 2021 VUS BRCA2. 2022 switched to exemestane. ) 2023 2 site left breast mass biopsy both show fat necrosis and inflammation. 2024 left breast mass biopsy benign lymph node.  Today, she denies new breast masses or nodules, skin or nipple changes, nipple discharge, or lymphadenopathy bilaterally.     Stage pT1c pN0 CM0     BREAST IMAGIN2025 bilateral diagnostic mammogram BI-RADS Category 2.     REPRODUCTIVE HISTORY: menarche age 17, , first birth age 20,  3 months, OCP's 5-10 years, menopause age 56, no HRT                                  FAMILY CANCER HISTORY:   Mother: Colon cancer     Surgical History  She has a past surgical history that includes Small intestine surgery (2014); Other surgical history (2022);  section, classic (2015); Other surgical history (2015); Hysteroscopy (2015); Breast biopsy (2024); and Breast lumpectomy (2021).     Social History  She reports that she has been smoking cigarettes. She has been exposed to tobacco smoke. She has never used smokeless tobacco. She reports that she does not drink alcohol and does not use drugs.    Family History  Family History[1]     Allergies  Patient has no known allergies.    Medications  Current Outpatient Medications    Medication Instructions    albuterol 90 mcg/actuation inhaler 2 puffs, Every 6 hours PRN    amLODIPine (Norvasc) 5 mg tablet Take by mouth.    atenolol (Tenormin) 25 mg tablet Take by mouth.    calcium carbonate (Tums) 200 mg calcium chewable tablet 1 tablet, Daily    exemestane (AROMASIN) 25 mg, oral, Daily, Take after a meal.  Try to take at the same time each day.    ezetimibe-atorvastatin 10-10 mg tablet Daily    levothyroxine (SYNTHROID, LEVOXYL) 200 mcg, Daily before breakfast    loratadine (CLARITIN) 10 mg, Daily    losartan (COZAAR) 100 mg, Daily    mv-calcium-min-iron fm-FA-vitK (Multi For Her) 18 mg iron-600 mcg-80 mcg tablet Take by mouth.    rosuvastatin (CRESTOR) 5 mg, Daily         REVIEW OF SYSTEMS  Review of Systems   Constitutional: Negative.    HENT:  Negative.     Eyes: Negative.    Respiratory: Negative.     Cardiovascular: Negative.    Gastrointestinal: Negative.    Endocrine: Negative.    Genitourinary: Negative.     Musculoskeletal: Negative.    Skin: Negative.    Neurological: Negative.    Hematological: Negative.    Psychiatric/Behavioral: Negative.              Past Medical History  She has a past medical history of Malignant neoplasm of upper-inner quadrant of left breast in female, estrogen receptor positive (12/15/2023), Personal history of irradiation (8/2021), Personal history of malignant neoplasm of breast (01/04/2022), Personal history of other diseases of the circulatory system (01/04/2022), Personal history of other endocrine, nutritional and metabolic disease, and Unspecified abnormal cytological findings in specimens from vagina.     Physical Exam  Patient is alert and oriented x3 and in a relaxed and appropriate mood. Her gait is steady and hand grasps are equal. Sclera is clear. The breasts are asymmetrical, right larger. Left breast contracted and well healed surgical incision. The tissue is soft without palpable abnormalities, discrete nodules or masses. The skin and  nipples appear normal. Bilateral lower axillary incisions well healed. There is no cervical, supraclavicular or axillary lymphadenopathy. Heart rate and rhythm normal, S1 and S2 appreciated. The lungs are clear to auscultation bilaterally. Abdomen is soft and non-tender.       Physical Exam     Last Recorded Vitals  Vitals:    06/26/25 1427   BP: 108/67   Pulse: 103   Resp: 20   Temp: 35.8 °C (96.4 °F)   SpO2: 91%       Relevant Results   Time was spent viewing digital images of the radiology testing with the patient. I explained the results in depth, along with suggested explanation for follow up recommendations based on the testing results. BI-RADS Category 2    Imaging         Assessment/Plan       Normal clinical breast exam and imaging, left breast cancer currently on exemestane. There is no evidence of breast cancer recurrence based on her clinical exam today.      Patient Discussion/Summary  Your clinical examination and imaging are normal. Please return in one year for bilateral screening mammogram and office visit or sooner if you have any problems or concerns.     You can see your health information, review clinical summaries from office visits & test results online when you follow your health with MY  Chart, a personal health record. To sign up go to [email or web address]. If you need assistance with signing up or trouble getting into your account call [employer] Patient Line 24/7 at [phone].    My office phone number is [phone] if you need to get in touch with me or have additional questions or concerns. Thank you for choosing [facility] and trusting me as your healthcare provider. I look forward to seeing you again at your next office visit. I am honored to be a provider on your health care team and I remain dedicated to helping you achieve your health goals.      Luma Carpenter, APRN-CNP         [1] No family history on file.

## 2025-06-26 ENCOUNTER — OFFICE VISIT (OUTPATIENT)
Dept: SURGICAL ONCOLOGY | Facility: HOSPITAL | Age: 65
End: 2025-06-26
Payer: COMMERCIAL

## 2025-06-26 ENCOUNTER — HOSPITAL ENCOUNTER (OUTPATIENT)
Dept: RADIOLOGY | Facility: HOSPITAL | Age: 65
Discharge: HOME | End: 2025-06-26
Payer: COMMERCIAL

## 2025-06-26 VITALS
BODY MASS INDEX: 35.7 KG/M2 | OXYGEN SATURATION: 91 % | RESPIRATION RATE: 20 BRPM | HEART RATE: 103 BPM | TEMPERATURE: 96.4 F | DIASTOLIC BLOOD PRESSURE: 67 MMHG | SYSTOLIC BLOOD PRESSURE: 108 MMHG | WEIGHT: 207.8 LBS

## 2025-06-26 DIAGNOSIS — Z12.31 SCREENING MAMMOGRAM FOR BREAST CANCER: ICD-10-CM

## 2025-06-26 DIAGNOSIS — C50.212 MALIGNANT NEOPLASM OF UPPER-INNER QUADRANT OF LEFT BREAST IN FEMALE, ESTROGEN RECEPTOR POSITIVE: ICD-10-CM

## 2025-06-26 DIAGNOSIS — Z85.3 PERSONAL HISTORY OF BREAST CANCER: Primary | ICD-10-CM

## 2025-06-26 DIAGNOSIS — Z17.0 MALIGNANT NEOPLASM OF UPPER-INNER QUADRANT OF LEFT BREAST IN FEMALE, ESTROGEN RECEPTOR POSITIVE: ICD-10-CM

## 2025-06-26 DIAGNOSIS — Z85.3 PERSONAL HISTORY OF BREAST CANCER: ICD-10-CM

## 2025-06-26 PROCEDURE — 99213 OFFICE O/P EST LOW 20 MIN: CPT

## 2025-06-26 PROCEDURE — 3078F DIAST BP <80 MM HG: CPT

## 2025-06-26 PROCEDURE — 77066 DX MAMMO INCL CAD BI: CPT

## 2025-06-26 PROCEDURE — 3074F SYST BP LT 130 MM HG: CPT

## 2025-06-26 ASSESSMENT — PAIN SCALES - GENERAL: PAINLEVEL_OUTOF10: 0-NO PAIN

## 2025-06-26 ASSESSMENT — ENCOUNTER SYMPTOMS
CARDIOVASCULAR NEGATIVE: 1
EYES NEGATIVE: 1
CONSTITUTIONAL NEGATIVE: 1
ENDOCRINE NEGATIVE: 1
HEMATOLOGIC/LYMPHATIC NEGATIVE: 1
GASTROINTESTINAL NEGATIVE: 1
NEUROLOGICAL NEGATIVE: 1
RESPIRATORY NEGATIVE: 1
MUSCULOSKELETAL NEGATIVE: 1
PSYCHIATRIC NEGATIVE: 1

## 2025-06-26 NOTE — PATIENT INSTRUCTIONS
Your clinical examination and imaging are normal. Please return in one year for bilateral screening mammogram and office visit or sooner if you have any problems or concerns.     You can see your health information, review clinical summaries from office visits & test results online when you follow your health with MY  Chart, a personal health record. To sign up go to [email or web address]. If you need assistance with signing up or trouble getting into your account call [employer] Patient Line 24/7 at [phone].    My office phone number is [phone] if you need to get in touch with me or have additional questions or concerns. Thank you for choosing [facility] and trusting me as your healthcare provider. I look forward to seeing you again at your next office visit. I am honored to be a provider on your health care team and I remain dedicated to helping you achieve your health goals.    
[id number]